# Patient Record
Sex: MALE | Race: OTHER | Employment: UNEMPLOYED | URBAN - METROPOLITAN AREA
[De-identification: names, ages, dates, MRNs, and addresses within clinical notes are randomized per-mention and may not be internally consistent; named-entity substitution may affect disease eponyms.]

---

## 2022-07-19 ENCOUNTER — APPOINTMENT (OUTPATIENT)
Dept: CT IMAGING | Age: 42
DRG: 440 | End: 2022-07-19

## 2022-07-19 ENCOUNTER — HOSPITAL ENCOUNTER (INPATIENT)
Age: 42
LOS: 2 days | Discharge: HOME | DRG: 440 | End: 2022-07-21
Attending: EMERGENCY MEDICINE | Admitting: INTERNAL MEDICINE

## 2022-07-19 ENCOUNTER — APPOINTMENT (OUTPATIENT)
Dept: ULTRASOUND IMAGING | Age: 42
DRG: 440 | End: 2022-07-19

## 2022-07-19 DIAGNOSIS — E11.9 DIABETES MELLITUS, NEW ONSET (HCC): ICD-10-CM

## 2022-07-19 DIAGNOSIS — K85.00 IDIOPATHIC ACUTE PANCREATITIS WITHOUT INFECTION OR NECROSIS: Primary | ICD-10-CM

## 2022-07-19 PROBLEM — K85.90 ACUTE PANCREATITIS WITHOUT INFECTION OR NECROSIS, UNSPECIFIED PANCREATITIS TYPE: Status: ACTIVE | Noted: 2022-07-19

## 2022-07-19 LAB
A/G RATIO: 1.5 (ref 1.1–2.2)
ALBUMIN SERPL-MCNC: 4 G/DL (ref 3.4–5)
ALBUMIN SERPL-MCNC: 4.2 G/DL (ref 3.4–5)
ALBUMIN SERPL-MCNC: 4.8 G/DL (ref 3.4–5)
ALP BLD-CCNC: 113 U/L (ref 40–129)
ALP BLD-CCNC: 128 U/L (ref 40–129)
ALP BLD-CCNC: 146 U/L (ref 40–129)
ALT SERPL-CCNC: 23 U/L (ref 10–40)
ALT SERPL-CCNC: 23 U/L (ref 10–40)
ALT SERPL-CCNC: 25 U/L (ref 10–40)
ANION GAP SERPL CALCULATED.3IONS-SCNC: 10 MMOL/L (ref 3–16)
ANION GAP SERPL CALCULATED.3IONS-SCNC: 12 MMOL/L (ref 3–16)
ANION GAP SERPL CALCULATED.3IONS-SCNC: 16 MMOL/L (ref 3–16)
AST SERPL-CCNC: 31 U/L (ref 15–37)
AST SERPL-CCNC: 35 U/L (ref 15–37)
AST SERPL-CCNC: 38 U/L (ref 15–37)
BASOPHILS ABSOLUTE: 0.1 K/UL (ref 0–0.2)
BASOPHILS RELATIVE PERCENT: 0.7 %
BASOPHILS RELATIVE PERCENT: 0.9 %
BASOPHILS RELATIVE PERCENT: 1.2 %
BILIRUB SERPL-MCNC: 0.7 MG/DL (ref 0–1)
BILIRUB SERPL-MCNC: 0.7 MG/DL (ref 0–1)
BILIRUB SERPL-MCNC: 0.8 MG/DL (ref 0–1)
BUN BLDV-MCNC: 16 MG/DL (ref 7–20)
BUN BLDV-MCNC: 17 MG/DL (ref 7–20)
BUN BLDV-MCNC: 18 MG/DL (ref 7–20)
C-REACTIVE PROTEIN: <3 MG/L (ref 0–5.1)
C-REACTIVE PROTEIN: <3 MG/L (ref 0–5.1)
CALCIUM SERPL-MCNC: 8.8 MG/DL (ref 8.3–10.6)
CALCIUM SERPL-MCNC: 9.1 MG/DL (ref 8.3–10.6)
CALCIUM SERPL-MCNC: 9.5 MG/DL (ref 8.3–10.6)
CHLORIDE BLD-SCNC: 89 MMOL/L (ref 99–110)
CHLORIDE BLD-SCNC: 94 MMOL/L (ref 99–110)
CHLORIDE BLD-SCNC: 98 MMOL/L (ref 99–110)
CO2: 27 MMOL/L (ref 21–32)
CO2: 30 MMOL/L (ref 21–32)
CO2: 30 MMOL/L (ref 21–32)
CREAT SERPL-MCNC: 0.7 MG/DL (ref 0.9–1.3)
CREAT SERPL-MCNC: 0.8 MG/DL (ref 0.9–1.3)
CREAT SERPL-MCNC: 0.8 MG/DL (ref 0.9–1.3)
EKG ATRIAL RATE: 94 BPM
EKG DIAGNOSIS: NORMAL
EKG P AXIS: 47 DEGREES
EKG P-R INTERVAL: 162 MS
EKG Q-T INTERVAL: 360 MS
EKG QRS DURATION: 104 MS
EKG QTC CALCULATION (BAZETT): 450 MS
EKG R AXIS: -30 DEGREES
EKG T AXIS: 34 DEGREES
EKG VENTRICULAR RATE: 94 BPM
EOSINOPHILS ABSOLUTE: 0 K/UL (ref 0–0.6)
EOSINOPHILS RELATIVE PERCENT: 0.4 %
EOSINOPHILS RELATIVE PERCENT: 0.5 %
EOSINOPHILS RELATIVE PERCENT: 0.5 %
GFR AFRICAN AMERICAN: >60
GFR NON-AFRICAN AMERICAN: >60
GLUCOSE BLD-MCNC: 112 MG/DL (ref 70–99)
GLUCOSE BLD-MCNC: 117 MG/DL (ref 70–99)
GLUCOSE BLD-MCNC: 155 MG/DL (ref 70–99)
GLUCOSE BLD-MCNC: 205 MG/DL (ref 70–99)
GLUCOSE BLD-MCNC: 206 MG/DL (ref 70–99)
GLUCOSE BLD-MCNC: 231 MG/DL (ref 70–99)
GLUCOSE BLD-MCNC: 298 MG/DL (ref 70–99)
GLUCOSE BLD-MCNC: 364 MG/DL (ref 70–99)
HCT VFR BLD CALC: 37.4 % (ref 40.5–52.5)
HCT VFR BLD CALC: 38.3 % (ref 40.5–52.5)
HCT VFR BLD CALC: 43 % (ref 40.5–52.5)
HEMOGLOBIN: 12.9 G/DL (ref 13.5–17.5)
HEMOGLOBIN: 13.3 G/DL (ref 13.5–17.5)
HEMOGLOBIN: 15.1 G/DL (ref 13.5–17.5)
LIPASE: 112 U/L (ref 13–60)
LYMPHOCYTES ABSOLUTE: 1.6 K/UL (ref 1–5.1)
LYMPHOCYTES ABSOLUTE: 1.8 K/UL (ref 1–5.1)
LYMPHOCYTES ABSOLUTE: 2 K/UL (ref 1–5.1)
LYMPHOCYTES RELATIVE PERCENT: 19.6 %
LYMPHOCYTES RELATIVE PERCENT: 27 %
LYMPHOCYTES RELATIVE PERCENT: 31.3 %
MCH RBC QN AUTO: 31.8 PG (ref 26–34)
MCH RBC QN AUTO: 32 PG (ref 26–34)
MCH RBC QN AUTO: 32.3 PG (ref 26–34)
MCHC RBC AUTO-ENTMCNC: 34.5 G/DL (ref 31–36)
MCHC RBC AUTO-ENTMCNC: 34.9 G/DL (ref 31–36)
MCHC RBC AUTO-ENTMCNC: 35.1 G/DL (ref 31–36)
MCV RBC AUTO: 91.8 FL (ref 80–100)
MCV RBC AUTO: 92 FL (ref 80–100)
MCV RBC AUTO: 92.1 FL (ref 80–100)
MONOCYTES ABSOLUTE: 0.4 K/UL (ref 0–1.3)
MONOCYTES ABSOLUTE: 0.5 K/UL (ref 0–1.3)
MONOCYTES ABSOLUTE: 0.9 K/UL (ref 0–1.3)
MONOCYTES RELATIVE PERCENT: 6.9 %
MONOCYTES RELATIVE PERCENT: 7.8 %
MONOCYTES RELATIVE PERCENT: 8.8 %
NEUTROPHILS ABSOLUTE: 3.1 K/UL (ref 1.7–7.7)
NEUTROPHILS ABSOLUTE: 4.4 K/UL (ref 1.7–7.7)
NEUTROPHILS ABSOLUTE: 7.3 K/UL (ref 1.7–7.7)
NEUTROPHILS RELATIVE PERCENT: 60.1 %
NEUTROPHILS RELATIVE PERCENT: 63.9 %
NEUTROPHILS RELATIVE PERCENT: 70.4 %
PDW BLD-RTO: 12.7 % (ref 12.4–15.4)
PDW BLD-RTO: 12.8 % (ref 12.4–15.4)
PDW BLD-RTO: 12.8 % (ref 12.4–15.4)
PERFORMED ON: ABNORMAL
PLATELET # BLD: 312 K/UL (ref 135–450)
PLATELET # BLD: 339 K/UL (ref 135–450)
PLATELET # BLD: 364 K/UL (ref 135–450)
PMV BLD AUTO: 7.7 FL (ref 5–10.5)
PMV BLD AUTO: 7.8 FL (ref 5–10.5)
PMV BLD AUTO: 7.8 FL (ref 5–10.5)
POTASSIUM REFLEX MAGNESIUM: 4.1 MMOL/L (ref 3.5–5.1)
POTASSIUM REFLEX MAGNESIUM: 4.2 MMOL/L (ref 3.5–5.1)
POTASSIUM REFLEX MAGNESIUM: 4.3 MMOL/L (ref 3.5–5.1)
RBC # BLD: 4.06 M/UL (ref 4.2–5.9)
RBC # BLD: 4.17 M/UL (ref 4.2–5.9)
RBC # BLD: 4.67 M/UL (ref 4.2–5.9)
SODIUM BLD-SCNC: 132 MMOL/L (ref 136–145)
SODIUM BLD-SCNC: 136 MMOL/L (ref 136–145)
SODIUM BLD-SCNC: 138 MMOL/L (ref 136–145)
TOTAL PROTEIN: 6.7 G/DL (ref 6.4–8.2)
TOTAL PROTEIN: 7 G/DL (ref 6.4–8.2)
TOTAL PROTEIN: 8 G/DL (ref 6.4–8.2)
TRIGL SERPL-MCNC: 1038 MG/DL (ref 0–150)
TRIGL SERPL-MCNC: 1060 MG/DL (ref 0–150)
TROPONIN: <0.01 NG/ML
WBC # BLD: 10.4 K/UL (ref 4–11)
WBC # BLD: 5.2 K/UL (ref 4–11)
WBC # BLD: 6.8 K/UL (ref 4–11)

## 2022-07-19 PROCEDURE — 2580000003 HC RX 258: Performed by: INTERNAL MEDICINE

## 2022-07-19 PROCEDURE — 6360000002 HC RX W HCPCS: Performed by: INTERNAL MEDICINE

## 2022-07-19 PROCEDURE — 84484 ASSAY OF TROPONIN QUANT: CPT

## 2022-07-19 PROCEDURE — 1200000000 HC SEMI PRIVATE

## 2022-07-19 PROCEDURE — 6370000000 HC RX 637 (ALT 250 FOR IP): Performed by: INTERNAL MEDICINE

## 2022-07-19 PROCEDURE — 86140 C-REACTIVE PROTEIN: CPT

## 2022-07-19 PROCEDURE — 6360000002 HC RX W HCPCS: Performed by: EMERGENCY MEDICINE

## 2022-07-19 PROCEDURE — 83690 ASSAY OF LIPASE: CPT

## 2022-07-19 PROCEDURE — 2580000003 HC RX 258: Performed by: PHYSICIAN ASSISTANT

## 2022-07-19 PROCEDURE — 2580000003 HC RX 258: Performed by: EMERGENCY MEDICINE

## 2022-07-19 PROCEDURE — C9113 INJ PANTOPRAZOLE SODIUM, VIA: HCPCS | Performed by: INTERNAL MEDICINE

## 2022-07-19 PROCEDURE — 76705 ECHO EXAM OF ABDOMEN: CPT

## 2022-07-19 PROCEDURE — 74177 CT ABD & PELVIS W/CONTRAST: CPT

## 2022-07-19 PROCEDURE — 96375 TX/PRO/DX INJ NEW DRUG ADDON: CPT

## 2022-07-19 PROCEDURE — 93010 ELECTROCARDIOGRAM REPORT: CPT | Performed by: INTERNAL MEDICINE

## 2022-07-19 PROCEDURE — 99285 EMERGENCY DEPT VISIT HI MDM: CPT

## 2022-07-19 PROCEDURE — 36415 COLL VENOUS BLD VENIPUNCTURE: CPT

## 2022-07-19 PROCEDURE — 80053 COMPREHEN METABOLIC PANEL: CPT

## 2022-07-19 PROCEDURE — 6360000004 HC RX CONTRAST MEDICATION: Performed by: EMERGENCY MEDICINE

## 2022-07-19 PROCEDURE — 84478 ASSAY OF TRIGLYCERIDES: CPT

## 2022-07-19 PROCEDURE — 93005 ELECTROCARDIOGRAM TRACING: CPT | Performed by: EMERGENCY MEDICINE

## 2022-07-19 PROCEDURE — 87449 NOS EACH ORGANISM AG IA: CPT

## 2022-07-19 PROCEDURE — 6360000002 HC RX W HCPCS

## 2022-07-19 PROCEDURE — 85025 COMPLETE CBC W/AUTO DIFF WBC: CPT

## 2022-07-19 PROCEDURE — 83036 HEMOGLOBIN GLYCOSYLATED A1C: CPT

## 2022-07-19 PROCEDURE — 96374 THER/PROPH/DIAG INJ IV PUSH: CPT

## 2022-07-19 RX ORDER — KETOROLAC TROMETHAMINE 30 MG/ML
30 INJECTION, SOLUTION INTRAMUSCULAR; INTRAVENOUS EVERY 6 HOURS PRN
Status: DISPENSED | OUTPATIENT
Start: 2022-07-19 | End: 2022-07-21

## 2022-07-19 RX ORDER — DIAZEPAM 5 MG/1
15 TABLET ORAL
Status: DISCONTINUED | OUTPATIENT
Start: 2022-07-19 | End: 2022-07-21 | Stop reason: HOSPADM

## 2022-07-19 RX ORDER — SODIUM CHLORIDE 0.9 % (FLUSH) 0.9 %
5-40 SYRINGE (ML) INJECTION EVERY 12 HOURS SCHEDULED
Status: DISCONTINUED | OUTPATIENT
Start: 2022-07-19 | End: 2022-07-21 | Stop reason: HOSPADM

## 2022-07-19 RX ORDER — LORAZEPAM 1 MG/1
3 TABLET ORAL
Status: DISCONTINUED | OUTPATIENT
Start: 2022-07-19 | End: 2022-07-19

## 2022-07-19 RX ORDER — ONDANSETRON 4 MG/1
4 TABLET, ORALLY DISINTEGRATING ORAL EVERY 8 HOURS PRN
Status: DISCONTINUED | OUTPATIENT
Start: 2022-07-19 | End: 2022-07-21 | Stop reason: HOSPADM

## 2022-07-19 RX ORDER — SODIUM CHLORIDE, SODIUM LACTATE, POTASSIUM CHLORIDE, CALCIUM CHLORIDE 600; 310; 30; 20 MG/100ML; MG/100ML; MG/100ML; MG/100ML
INJECTION, SOLUTION INTRAVENOUS CONTINUOUS
Status: DISCONTINUED | OUTPATIENT
Start: 2022-07-19 | End: 2022-07-19

## 2022-07-19 RX ORDER — SODIUM CHLORIDE 0.9 % (FLUSH) 0.9 %
5-40 SYRINGE (ML) INJECTION PRN
Status: DISCONTINUED | OUTPATIENT
Start: 2022-07-19 | End: 2022-07-21 | Stop reason: HOSPADM

## 2022-07-19 RX ORDER — MORPHINE SULFATE 2 MG/ML
2 INJECTION, SOLUTION INTRAMUSCULAR; INTRAVENOUS EVERY 4 HOURS PRN
Status: DISCONTINUED | OUTPATIENT
Start: 2022-07-19 | End: 2022-07-21 | Stop reason: HOSPADM

## 2022-07-19 RX ORDER — INSULIN LISPRO 100 [IU]/ML
0-6 INJECTION, SOLUTION INTRAVENOUS; SUBCUTANEOUS EVERY 4 HOURS
Status: DISCONTINUED | OUTPATIENT
Start: 2022-07-19 | End: 2022-07-20

## 2022-07-19 RX ORDER — DIAZEPAM 5 MG/ML
5 INJECTION, SOLUTION INTRAMUSCULAR; INTRAVENOUS
Status: DISCONTINUED | OUTPATIENT
Start: 2022-07-19 | End: 2022-07-21 | Stop reason: HOSPADM

## 2022-07-19 RX ORDER — LORAZEPAM 2 MG/ML
1 INJECTION INTRAMUSCULAR
Status: DISCONTINUED | OUTPATIENT
Start: 2022-07-19 | End: 2022-07-19

## 2022-07-19 RX ORDER — ONDANSETRON 2 MG/ML
INJECTION INTRAMUSCULAR; INTRAVENOUS
Status: COMPLETED
Start: 2022-07-19 | End: 2022-07-19

## 2022-07-19 RX ORDER — SODIUM CHLORIDE, SODIUM LACTATE, POTASSIUM CHLORIDE, CALCIUM CHLORIDE 600; 310; 30; 20 MG/100ML; MG/100ML; MG/100ML; MG/100ML
1000 INJECTION, SOLUTION INTRAVENOUS ONCE
Status: COMPLETED | OUTPATIENT
Start: 2022-07-19 | End: 2022-07-19

## 2022-07-19 RX ORDER — LORAZEPAM 1 MG/1
2 TABLET ORAL
Status: DISCONTINUED | OUTPATIENT
Start: 2022-07-19 | End: 2022-07-19

## 2022-07-19 RX ORDER — DIAZEPAM 5 MG/1
10 TABLET ORAL
Status: DISCONTINUED | OUTPATIENT
Start: 2022-07-19 | End: 2022-07-21 | Stop reason: HOSPADM

## 2022-07-19 RX ORDER — DIAZEPAM 5 MG/ML
10 INJECTION, SOLUTION INTRAMUSCULAR; INTRAVENOUS
Status: DISCONTINUED | OUTPATIENT
Start: 2022-07-19 | End: 2022-07-21 | Stop reason: HOSPADM

## 2022-07-19 RX ORDER — ONDANSETRON 2 MG/ML
4 INJECTION INTRAMUSCULAR; INTRAVENOUS EVERY 6 HOURS PRN
Status: DISCONTINUED | OUTPATIENT
Start: 2022-07-19 | End: 2022-07-19 | Stop reason: SDUPTHER

## 2022-07-19 RX ORDER — SODIUM CHLORIDE 9 MG/ML
INJECTION, SOLUTION INTRAVENOUS PRN
Status: DISCONTINUED | OUTPATIENT
Start: 2022-07-19 | End: 2022-07-21 | Stop reason: HOSPADM

## 2022-07-19 RX ORDER — GAUZE BANDAGE 2" X 2"
100 BANDAGE TOPICAL DAILY
Status: DISCONTINUED | OUTPATIENT
Start: 2022-07-19 | End: 2022-07-21 | Stop reason: HOSPADM

## 2022-07-19 RX ORDER — PANTOPRAZOLE SODIUM 40 MG/10ML
40 INJECTION, POWDER, LYOPHILIZED, FOR SOLUTION INTRAVENOUS DAILY
Status: DISCONTINUED | OUTPATIENT
Start: 2022-07-19 | End: 2022-07-21 | Stop reason: HOSPADM

## 2022-07-19 RX ORDER — DEXTROSE MONOHYDRATE 50 MG/ML
100 INJECTION, SOLUTION INTRAVENOUS PRN
Status: DISCONTINUED | OUTPATIENT
Start: 2022-07-19 | End: 2022-07-21 | Stop reason: HOSPADM

## 2022-07-19 RX ORDER — DIAZEPAM 5 MG/1
20 TABLET ORAL
Status: DISCONTINUED | OUTPATIENT
Start: 2022-07-19 | End: 2022-07-21 | Stop reason: HOSPADM

## 2022-07-19 RX ORDER — ONDANSETRON 2 MG/ML
4 INJECTION INTRAMUSCULAR; INTRAVENOUS EVERY 6 HOURS PRN
Status: DISCONTINUED | OUTPATIENT
Start: 2022-07-19 | End: 2022-07-21 | Stop reason: HOSPADM

## 2022-07-19 RX ORDER — LORAZEPAM 2 MG/ML
4 INJECTION INTRAMUSCULAR
Status: DISCONTINUED | OUTPATIENT
Start: 2022-07-19 | End: 2022-07-19

## 2022-07-19 RX ORDER — SODIUM CHLORIDE, SODIUM LACTATE, POTASSIUM CHLORIDE, CALCIUM CHLORIDE 600; 310; 30; 20 MG/100ML; MG/100ML; MG/100ML; MG/100ML
INJECTION, SOLUTION INTRAVENOUS CONTINUOUS
Status: DISCONTINUED | OUTPATIENT
Start: 2022-07-19 | End: 2022-07-20 | Stop reason: ALTCHOICE

## 2022-07-19 RX ORDER — ENOXAPARIN SODIUM 100 MG/ML
40 INJECTION SUBCUTANEOUS DAILY
Status: DISCONTINUED | OUTPATIENT
Start: 2022-07-19 | End: 2022-07-21 | Stop reason: HOSPADM

## 2022-07-19 RX ORDER — DIAZEPAM 5 MG/ML
15 INJECTION, SOLUTION INTRAMUSCULAR; INTRAVENOUS
Status: DISCONTINUED | OUTPATIENT
Start: 2022-07-19 | End: 2022-07-21 | Stop reason: HOSPADM

## 2022-07-19 RX ORDER — DIAZEPAM 5 MG/ML
20 INJECTION, SOLUTION INTRAMUSCULAR; INTRAVENOUS
Status: DISCONTINUED | OUTPATIENT
Start: 2022-07-19 | End: 2022-07-21 | Stop reason: HOSPADM

## 2022-07-19 RX ORDER — LORAZEPAM 2 MG/ML
2 INJECTION INTRAMUSCULAR
Status: DISCONTINUED | OUTPATIENT
Start: 2022-07-19 | End: 2022-07-19

## 2022-07-19 RX ORDER — MORPHINE SULFATE 4 MG/ML
4 INJECTION, SOLUTION INTRAMUSCULAR; INTRAVENOUS EVERY 4 HOURS PRN
Status: DISCONTINUED | OUTPATIENT
Start: 2022-07-19 | End: 2022-07-21 | Stop reason: HOSPADM

## 2022-07-19 RX ORDER — LORAZEPAM 1 MG/1
1 TABLET ORAL
Status: DISCONTINUED | OUTPATIENT
Start: 2022-07-19 | End: 2022-07-19

## 2022-07-19 RX ORDER — DIAZEPAM 5 MG/1
5 TABLET ORAL
Status: DISCONTINUED | OUTPATIENT
Start: 2022-07-19 | End: 2022-07-21 | Stop reason: HOSPADM

## 2022-07-19 RX ORDER — LORAZEPAM 1 MG/1
4 TABLET ORAL
Status: DISCONTINUED | OUTPATIENT
Start: 2022-07-19 | End: 2022-07-19

## 2022-07-19 RX ORDER — PANTOPRAZOLE SODIUM 40 MG/1
40 TABLET, DELAYED RELEASE ORAL
Status: DISCONTINUED | OUTPATIENT
Start: 2022-07-20 | End: 2022-07-19

## 2022-07-19 RX ORDER — FOLIC ACID 1 MG/1
1 TABLET ORAL DAILY
Status: DISCONTINUED | OUTPATIENT
Start: 2022-07-19 | End: 2022-07-21 | Stop reason: HOSPADM

## 2022-07-19 RX ORDER — KETOROLAC TROMETHAMINE 30 MG/ML
15 INJECTION, SOLUTION INTRAMUSCULAR; INTRAVENOUS ONCE
Status: COMPLETED | OUTPATIENT
Start: 2022-07-19 | End: 2022-07-19

## 2022-07-19 RX ORDER — LORAZEPAM 2 MG/ML
3 INJECTION INTRAMUSCULAR
Status: DISCONTINUED | OUTPATIENT
Start: 2022-07-19 | End: 2022-07-19

## 2022-07-19 RX ADMIN — SODIUM CHLORIDE, POTASSIUM CHLORIDE, SODIUM LACTATE AND CALCIUM CHLORIDE: 600; 310; 30; 20 INJECTION, SOLUTION INTRAVENOUS at 22:26

## 2022-07-19 RX ADMIN — ENOXAPARIN SODIUM 40 MG: 100 INJECTION SUBCUTANEOUS at 17:24

## 2022-07-19 RX ADMIN — SODIUM CHLORIDE, POTASSIUM CHLORIDE, SODIUM LACTATE AND CALCIUM CHLORIDE: 600; 310; 30; 20 INJECTION, SOLUTION INTRAVENOUS at 17:25

## 2022-07-19 RX ADMIN — INSULIN LISPRO 2 UNITS: 100 INJECTION, SOLUTION INTRAVENOUS; SUBCUTANEOUS at 13:34

## 2022-07-19 RX ADMIN — KETOROLAC TROMETHAMINE 30 MG: 30 INJECTION, SOLUTION INTRAMUSCULAR at 20:09

## 2022-07-19 RX ADMIN — IOPAMIDOL 75 ML: 755 INJECTION, SOLUTION INTRAVENOUS at 06:00

## 2022-07-19 RX ADMIN — MORPHINE SULFATE 4 MG: 4 INJECTION, SOLUTION INTRAMUSCULAR; INTRAVENOUS at 17:24

## 2022-07-19 RX ADMIN — FOLIC ACID 1 MG: 1 TABLET ORAL at 13:34

## 2022-07-19 RX ADMIN — ONDANSETRON 4 MG: 2 INJECTION INTRAMUSCULAR; INTRAVENOUS at 05:26

## 2022-07-19 RX ADMIN — MORPHINE SULFATE 4 MG: 4 INJECTION, SOLUTION INTRAMUSCULAR; INTRAVENOUS at 11:22

## 2022-07-19 RX ADMIN — SODIUM CHLORIDE, POTASSIUM CHLORIDE, SODIUM LACTATE AND CALCIUM CHLORIDE 1000 ML: 600; 310; 30; 20 INJECTION, SOLUTION INTRAVENOUS at 05:34

## 2022-07-19 RX ADMIN — KETOROLAC TROMETHAMINE 15 MG: 30 INJECTION, SOLUTION INTRAMUSCULAR at 05:27

## 2022-07-19 RX ADMIN — SODIUM CHLORIDE, POTASSIUM CHLORIDE, SODIUM LACTATE AND CALCIUM CHLORIDE: 600; 310; 30; 20 INJECTION, SOLUTION INTRAVENOUS at 07:33

## 2022-07-19 RX ADMIN — THIAMINE HCL TAB 100 MG 100 MG: 100 TAB at 13:34

## 2022-07-19 RX ADMIN — INSULIN LISPRO 3 UNITS: 100 INJECTION, SOLUTION INTRAVENOUS; SUBCUTANEOUS at 07:33

## 2022-07-19 RX ADMIN — PANTOPRAZOLE SODIUM 40 MG: 40 INJECTION, POWDER, FOR SOLUTION INTRAVENOUS at 13:33

## 2022-07-19 RX ADMIN — SODIUM CHLORIDE, PRESERVATIVE FREE 10 ML: 5 INJECTION INTRAVENOUS at 20:14

## 2022-07-19 RX ADMIN — KETOROLAC TROMETHAMINE 30 MG: 30 INJECTION, SOLUTION INTRAMUSCULAR at 13:31

## 2022-07-19 ASSESSMENT — PAIN DESCRIPTION - DESCRIPTORS: DESCRIPTORS: ACHING

## 2022-07-19 ASSESSMENT — PAIN SCALES - GENERAL
PAINLEVEL_OUTOF10: 10
PAINLEVEL_OUTOF10: 10
PAINLEVEL_OUTOF10: 8
PAINLEVEL_OUTOF10: 9
PAINLEVEL_OUTOF10: 7
PAINLEVEL_OUTOF10: 10
PAINLEVEL_OUTOF10: 7
PAINLEVEL_OUTOF10: 10

## 2022-07-19 ASSESSMENT — PAIN DESCRIPTION - ORIENTATION: ORIENTATION: RIGHT

## 2022-07-19 ASSESSMENT — PAIN DESCRIPTION - LOCATION
LOCATION: ABDOMEN
LOCATION: ABDOMEN

## 2022-07-19 ASSESSMENT — PAIN - FUNCTIONAL ASSESSMENT: PAIN_FUNCTIONAL_ASSESSMENT: 0-10

## 2022-07-19 NOTE — CONSULTS
Gastroenterology Consult Note        Patient: Katherin Anne  : 1980  Acct#:      Date:  2022    Subjective:       History of Present Illness  Patient is a 37 y.o.  male admitted with Diabetes mellitus, new onset ([facility] [E11.9]  Idiopathic acute pancreatitis without infection or necrosis [K85.00]  Acute pancreatitis without infection or necrosis, unspecified pancreatitis type [K85.90] who is seen in consult for acute pancreatitis. History of diabetes. Patient reports being diagnosed with acute pancreatitis 3 years ago in July. States it was from alcohol abuse. At that time he was drinking alcohol daily. Now drinks a sixpack of beer over the weekend. Last night he had sudden onset of sharp, severe epigastric pain which radiated around into the back. Had associated nausea but no vomiting. Pain persisted so he came to the ER. Lipase was 112. CT consistent with acute pancreatitis. There was also fatty liver and a hypodense nodule in the right hepatic lobe for which MRI was recommended. Still with abdominal pain today but controlled with pain meds. Does not take any meds at home. Past Medical History:   Diagnosis Date    Acute pancreatitis     Diabetes mellitus ([facility]       Past Surgical History:   Procedure Laterality Date    APPENDECTOMY        Past Endoscopic History    Admission Meds  No current facility-administered medications on file prior to encounter. Current Outpatient Medications on File Prior to Encounter   Medication Sig Dispense Refill    ibuprofen (IBU) 800 MG tablet Take 1 tablet by mouth every 6 hours as needed for Pain.  120 tablet 0            Allergies  No Known Allergies   Social   Social History     Tobacco Use    Smoking status: Never    Smokeless tobacco: Never   Substance Use Topics    Alcohol use: Not on file     Comment: weekend        Family History   Problem Relation Age of Onset    No Known Problems Mother     No Known Problems Father Review of Systems  Constitutional: negative for fevers, chills, sweats    Ears, nose, mouth, throat, and face: negative for nasal congestion and sore throat   Respiratory: negative for cough and shortness of breath   Cardiovascular: negative for chest pain and dyspnea   Gastrointestinal: see hpi   Genitourinary:negative for dysuria and frequency   Integument/breast: negative for pruritus and rash   Hematologic/lymphatic: negative for bleeding and easy bruising   Musculoskeletal:negative for arthralgias and myalgias   Neurological: negative for dizziness and weakness       Physical Exam  Blood pressure 136/80, pulse 78, temperature 98.3 °F (36.8 °C), temperature source Oral, resp. rate 14, height 5' 0.63\" (1.54 m), weight 138 lb 1.6 oz (62.6 kg), SpO2 98 %. General appearance: alert, cooperative, no distress, appears stated age  Eyes: Anicteric  Head: Normocephalic, without obvious abnormality  Lungs: clear to auscultation bilaterally, Normal Effort  Heart: regular rate and rhythm, normal S1 and S2, no murmurs or rubs  Abdomen: soft, non-tender. Bowel sounds normal. No masses,  no organomegaly. Extremities: atraumatic, no cyanosis or edema  Skin: warm and dry, no jaundice  Neuro: Grossly intact, A&OX3  Musculoskeletal: 5/5  strength BUE      Data Review:    Recent Labs     07/19/22  0506 07/19/22  1145   WBC 5.2 6.8   HGB 15.1 13.3*   HCT 43.0 38.3*   MCV 92.0 91.8    339     Recent Labs     07/19/22  0506 07/19/22  1145   * 136   K 4.1 4.2   CL 89* 94*   CO2 27 30   BUN 17 18   CREATININE 0.8* 0.8*     Recent Labs     07/19/22  0506 07/19/22  1145   AST 38* 31   ALT 25 23   BILITOT 0.8 0.7   ALKPHOS 146* 128     Recent Labs     07/19/22  0506   LIPASE 112.0*     No results for input(s): PROTIME, INR in the last 72 hours. No results for input(s): PTT in the last 72 hours. No results for input(s): OCCULTBLD in the last 72 hours.     Imaging Studies: personally performed a face to face diagnostic evaluation on this patient. I have interviewed and examined the patient and I agree with the findings and recommended plan of care. In summary, my findings and plan are the followin-year-old Kyrgyz-speaking male presenting with acute onset epigastric pain with radiation to the back. History of diabetes and had alcoholic induced pancreatitis about 3 years ago. Currently, patient only drinks 6 beers over the weekend. Work-up here confirmed acute pancreatitis with incidental findings of hyperdense nodule in the right hepatic lobe, calcifications in the pancreas   Vital signs are stable. Abdomen soft, tender in the epigastrium. No rebound or guarding  Impressions  Acute pancreatitis. Patient denies heavy alcohol use currently.   This could be idiopathic or from hypertriglyceridemia since patient is diabetic  Plans: IVF  ml/hour, NPO, pain control per primary service, check lipid panel in AM labs, OP EUS in 6-8 weeks     Shell English MD, MSc  [facility]  22

## 2022-07-19 NOTE — H&P
medical history of Diabetes mellitus ([facility] Past Surgical History:   has a past surgical history that includes Appendectomy. Medications:  No current facility-administered medications on file prior to encounter. Current Outpatient Medications on File Prior to Encounter   Medication Sig Dispense Refill    ibuprofen (IBU) 800 MG tablet Take 1 tablet by mouth every 6 hours as needed for Pain. 120 tablet 0       Allergies:  No Known Allergies     Social History:  Patient Lives with family. reports that he has never smoked. He has never used smokeless tobacco. He reports that he does not use drugs. Family History:  family history includes No Known Problems in his father and mother. Physical Exam:  BP (!) 143/91   Pulse 100   Temp 97.9 °F (36.6 °C) (Oral)   Resp 20   SpO2 98%     General appearance: Appears comfortable. Well nourished  Eyes: Sclera clear, pupils equal  ENT: Moist mucus membranes, no thrush. Trachea midline. Cardiovascular: Regular rhythm, normal S1, S2. No murmur, gallop, rub. No edema in lower extremities  Respiratory: Clear to auscultation bilaterally, no wheeze, good inspiratory effort  Gastrointestinal: Abdomen soft, epigastric tenderness, not distended, normal bowel sounds  Musculoskeletal: No cyanosis in digits, neck supple  Neurology: Cranial nerves grossly intact. Alert and oriented in time, place and person. No speech or motor deficits  Psychiatry: Appropriate affect. Not agitated  Skin: Warm, dry, normal turgor, no rash  Brisk capillary refill, peripheral pulses palpable.     Labs:  CBC:   Lab Results   Component Value Date/Time    WBC 5.2 07/19/2022 05:06 AM    RBC 4.67 07/19/2022 05:06 AM    HGB 15.1 07/19/2022 05:06 AM    HCT 43.0 07/19/2022 05:06 AM    MCV 92.0 07/19/2022 05:06 AM    MCH 32.3 07/19/2022 05:06 AM    MCHC 35.1 07/19/2022 05:06 AM    RDW 12.8 07/19/2022 05:06 AM     07/19/2022 05:06 AM    MPV 7.8 07/19/2022 05:06 AM     BMP:    Lab Results Component Value Date/Time     07/19/2022 05:06 AM    K 4.1 07/19/2022 05:06 AM    CL 89 07/19/2022 05:06 AM    CO2 27 07/19/2022 05:06 AM    BUN 17 07/19/2022 05:06 AM    CREATININE 0.8 07/19/2022 05:06 AM    CALCIUM 9.5 07/19/2022 05:06 AM    GFRAA >60 07/19/2022 05:06 AM    LABGLOM >60 07/19/2022 05:06 AM    GLUCOSE 364 07/19/2022 05:06 AM     CT ABDOMEN PELVIS W IV CONTRAST Additional Contrast? None   Final Result   Ill-defined peripancreatic fluid and edema, compatible with acute   pancreatitis. No drainable pseudocyst noted      Fatty liver. Hyperdense nodule is seen peripherally in the right hepatic   lobe. Most commonly this represents focal fatty sparing or hemangioma in a   patient of this age. Consider follow-up non urgent abdominal MRI, with and   without IV contrast, liver protocol for further characterization      No obvious gallstones noted on CT      Punctate pulmonary nodule left lower lobe. Please see follow-up   recommendations below      Guidelines for follow-up and management of pulmonary nodules found on abdomen   CT:      <6 mm - No follow up recommend on the basis of the estimated low risk of   malignancy. Radiology 2017 http://pubs. rsna.org/doi/full/10.1148/radiol. 2823339831         [address] organ? LIVER, GALLBLADDER    (Results Pending)     Chest Xray: Chest imaging as above  EKG:  NSR, LAD  I visualized CXR images and EKG strip. Problem List  Principal Problem:    Acute pancreatitis without infection or necrosis, unspecified pancreatitis type  Resolved Problems:    * No resolved hospital problems. *        Assessment/Plan:     Acute alcoholic pancreatitis:  CT abdomen and pelvis: Ill-defined peripancreatic fluid and edema compatible with acute pancreatitis. Continue aggressive IV fluid resuscitation. Monitor BUN/hematocrit. NPO. Pain control. Follow-up triglycerides and RUQ US. Alcohol abuse:  Cessation counseling provided.   Social work consult to provide resources. Folate, thiamine and multivitamin. Monitor on CIWA protocol. T2 DM not on long-term insulin with hyperglycemia:  Follow-up A1c. Monitor blood glucose on CDI. Hepatic Nodule: Incidental finding on CT. MRI liver protocol recommended. Thank you      Pulmonary Nodule:  <6 mm in the left lower lobe. No follow-up recommended. DVT prophylaxis: Lovenox  Code status: Full code  Diet: NPO  IV access: peripheral  [place]ved[place] Cathete: None    Admit as inpatient. I anticipate hospitalization spanning more than two midnights for investigation and treatment of the above medically necessary diagnoses. Please note that some part of this chart was generated using Dragon dictation software. Although every effort was made to ensure the accuracy of this automated transcription, some errors in transcription may have occurred inadvertently. If you may need any clarification, please do not hesitate to contact me through [facility].        Gabriel Velásquez MD    7/19/2022 10:17 AM

## 2022-07-19 NOTE — ED PROVIDER NOTES
[facility] [address]  eMERGENCY dEPARTMENT eNCOUnter        Pt Name: Vandana Allen  MRN: [id number]  [place] 1980  Date of evaluation: 7/19/2022  Provider: Ramandeep López MD  PCP: No primary care provider on file. CHIEF COMPLAINT       Chief Complaint   Patient presents with    Abdominal Pain     Patient triaged using  #[id number], states pain in the pit of his stomach that goes to his back, states started yesterday around 9 pm. States [place] and had pancreatitis. HISTORY OFPRESENT ILLNESS   (Location/Symptom, Timing/Onset, Context/Setting, Quality, Duration, Modifying Factors,Severity)  Note limiting factors. Vandana Allen is a 37 y.o. male complains of epigastric pain that radiates into the back since this evening with nausea and vomiting patient had 8 beers 4 days ago has had pancreatitis in the past and his [place] country states the pain is fairly constant    Nursing Notes were all reviewed and agreed with or any disagreements were addressed  in the HPI. REVIEW OF SYSTEMS    (2-9 systems for level 4, 10 or more for level 5)       REVIEW OF SYSTEMS    Constitutional:  Denies fever, chills, or weakness   Eyes:  Denies vision changes  HENT:  Denies sore throat or neck pain   Respiratory:  Denies cough or shortness of breath   Cardiovascular:  Denies chest pain  GI:  Denies abdominal pain, nausea, vomiting, or diarrhea   Musculoskeletal:  Denies back pain   Skin: no rash or vesicles   Neurologic:  no headache weakness focal    Lymphatic:  no swollen  nodes   Psychiatric: no si or hs thoughts     All systems negative except as marked. Positives and Pertinent negatives as per HPI. Except as noted above in the ROS, all other systems were reviewed andnegative.        PASTMEDICAL HISTORY     Past Medical History:   Diagnosis Date    Acute pancreatitis     Diabetes mellitus ([facility]          SURGICAL HISTORY       Past Surgical History:   Procedure Laterality Date    APPENDECTOMY           CURRENT MEDICATIONS       Current Discharge Medication List        CONTINUE these medications which have NOT CHANGED    Details   ibuprofen (IBU) 800 MG tablet Take 1 tablet by mouth every 6 hours as needed for Pain. Qty: 120 tablet, Refills: 0             ALLERGIES     Patient has no known allergies. FAMILY HISTORY       Family History   Problem Relation Age of Onset    No Known Problems Mother     No Known Problems Father           SOCIAL HISTORY       Social History     Socioeconomic History    Marital status: Single     Spouse name: None    Number of children: None    Years of education: None    Highest education level: None   Tobacco Use    Smoking status: Never    Smokeless tobacco: Never   Substance and Sexual Activity    Drug use: No       SCREENINGS    [place] Coma Scale  Eye Opening: Spontaneous  Best Verbal Response: Oriented  Best Motor Response: Obeys commands  [place] Coma Scale Score: 15        PHYSICAL EXAM    (up to 7 for level 4, 8 or more for level 5)     ED Triage Vitals [07/19/22 0422]   BP Temp Temp Source Heart Rate Resp SpO2 Height Weight   125/82 97.9 °F (36.6 °C) Oral 100 20 100 % -- --           General Appearance:  Alert, cooperative, no distress, appears stated age. Head:  Normocephalic, without obvious abnormality, atraumatic. Eyes:  conjunctiva/corneas clear, EOM's intact. Sclera anicteric. ENT: Mucous membranes moist.   Neck: Supple, symmetrical, trachea midline, no adenopathy. No jugular venous distention. Lungs:   No Respiratory Distress. no rales  rhonchi rub   Chest Wall:  Nontender  no deformity   Heart:  Rsr no murmer gallop    Abdomen:   Soft epigastric tenderness no organomegally    Extremities:  Full range of motion. no deformity   Pulses: Equal  upper and lower    Skin:  No rashes or lesions to exposed skin. Neurologic: Alert and oriented X 3. Motor grossly normal.  Speech clear.  Cr n 2-12 intact       DIAGNOSTIC RESULTS   LABS:    Labs Reviewed   COMPREHENSIVE METABOLIC PANEL W/ REFLEX TO MG FOR LOW K - Abnormal; Notable for the following components:       Result Value    Sodium 132 (*)     Chloride 89 (*)     Glucose 364 (*)     CREATININE 0.8 (*)     Alkaline Phosphatase 146 (*)     AST 38 (*)     All other components within normal limits   LIPASE - Abnormal; Notable for the following components:    Lipase 112.0 (*)     All other components within normal limits   CBC WITH AUTO DIFFERENTIAL - Abnormal; Notable for the following components:    RBC 4.17 (*)     Hemoglobin 13.3 (*)     Hematocrit 38.3 (*)     All other components within normal limits   COMPREHENSIVE METABOLIC PANEL W/ REFLEX TO MG FOR LOW K - Abnormal; Notable for the following components:    Chloride 94 (*)     Glucose 206 (*)     CREATININE 0.8 (*)     All other components within normal limits   TRIGLYCERIDES - Abnormal; Notable for the following components:    Triglycerides 1,038 (*)     All other components within normal limits   CBC WITH AUTO DIFFERENTIAL - Abnormal; Notable for the following components:    RBC 4.06 (*)     Hemoglobin 12.9 (*)     Hematocrit 37.4 (*)     All other components within normal limits   COMPREHENSIVE METABOLIC PANEL W/ REFLEX TO MG FOR LOW K - Abnormal; Notable for the following components:    Chloride 98 (*)     Glucose 155 (*)     CREATININE 0.7 (*)     All other components within normal limits   TRIGLYCERIDES - Abnormal; Notable for the following components:    Triglycerides 1,060 (*)     All other components within normal limits   POCT GLUCOSE - Abnormal; Notable for the following components:    POC Glucose 298 (*)     All other components within normal limits   POCT GLUCOSE - Abnormal; Notable for the following components:    POC Glucose 231 (*)     All other components within normal limits   POCT GLUCOSE - Abnormal; Notable for the following components:    POC Glucose 205 (*)     All other components within normal limits POCT GLUCOSE - Abnormal; Notable for the following components:    POC Glucose 112 (*)     All other components within normal limits   POCT GLUCOSE - Abnormal; Notable for the following components:    POC Glucose 117 (*)     All other components within normal limits   POCT GLUCOSE - Abnormal; Notable for the following components:    POC Glucose 132 (*)     All other components within normal limits   LEGIONELLA ANTIGEN, URINE   CBC WITH AUTO DIFFERENTIAL   TROPONIN   TROPONIN   C-REACTIVE PROTEIN   C-REACTIVE PROTEIN   TROPONIN   HEMOGLOBIN A1C   HEMOGLOBIN A1C   CBC WITH AUTO DIFFERENTIAL   COMPREHENSIVE METABOLIC PANEL   MAGNESIUM   PHOSPHORUS   LIPID PANEL   POCT GLUCOSE   POCT GLUCOSE   POCT GLUCOSE   POCT GLUCOSE   POCT GLUCOSE   POCT GLUCOSE   POCT GLUCOSE   POCT GLUCOSE   POCT GLUCOSE       All other labs were within normal range or not returned as of thisdictation. EKG: All EKG's are interpreted by the Emergency Department Physician who either signs or Co-signs this chart in the absence of a cardiologist.  EKG Interpretation    Interpreted by emergency department physician    Rhythm: normal sinus   Rate: normal  Axis: normal  Ectopy: none  Conduction: normal  ST Segments: no acute change  T Waves: no acute change  Q Waves: none    Clinical Impression: Normal sinus rhythm    Norman Mccauley MD        RADIOLOGY:   Non-plain film images such as CT, Ultrasound and MRI are read by the radiologist. Plainradiographic images are visualized and preliminarily interpreted by the  ED Provider with the belowfindings:        Interpretation per the Radiologist below, if available at the time of this note:    [address] [place] organ? LIVER, GALLBLADDER   Final Result   No cholelithiasis or cholecystitis      Fatty liver.       Nodular density seen in the liver on recent CT scan is not clearly seen by   ultrasound      RECOMMENDATIONS:   Unavailable         CT ABDOMEN PELVIS W IV CONTRAST Additional Contrast? None   Final Result   Ill-defined peripancreatic fluid and edema, compatible with acute   pancreatitis. No drainable pseudocyst noted      Fatty liver. Hyperdense nodule is seen peripherally in the right hepatic   lobe. Most commonly this represents focal fatty sparing or hemangioma in a   patient of this age. Consider follow-up non urgent abdominal MRI, with and   without IV contrast, liver protocol for further characterization      No obvious gallstones noted on CT      Punctate pulmonary nodule left lower lobe. Please see follow-up   recommendations below      Guidelines for follow-up and management of pulmonary nodules found on abdomen   CT:      <6 mm - No follow up recommend on the basis of the estimated low risk of   malignancy. Radiology 2017 http://pubs. rsna.org/doi/full/10.1148/radiol. 8316724213               PROCEDURES   Unless otherwise noted below, none     Procedures    CRITICAL CARE TIME   N/A      CONSULTS:  IP CONSULT TO GI  IP CONSULT TO SOCIAL WORK    EMERGENCY DEPARTMENT COURSE and DIFFERENTIAL DIAGNOSIS/MDM:   Vitals:    Vitals:    07/19/22 1207 07/19/22 1830 07/19/22 1945 07/20/22 0138   BP:  (!) 154/84 (!) 152/78 (!) 146/88   Pulse:  83 80 62   Resp:  14 16 18   Temp:  98.1 °F (36.7 °C) 98 °F (36.7 °C) 97.9 °F (36.6 °C)   TempSrc:  Oral Oral Oral   SpO2:  98% 98% 99%   Weight:       Height: 5' 0.63\" (1.54 m)          Patient was given the following medications:  Medications   dextrose bolus 10% 125 mL (has no administration in time range)     Or   dextrose bolus 10% 250 mL (has no administration in time range)   glucagon (rDNA) injection 1 mg (has no administration in time range)   dextrose 5 % solution (has no administration in time range)   insulin lispro (HUMALOG) injection vial 0-6 Units (0 Units SubCUTAneous Not Given 7/20/22 0056)   sodium chloride flush 0.9 % injection 5-40 mL (10 mLs IntraVENous Given 7/19/22 2014)   sodium chloride flush 0.9 % injection 5-40 mL (has no administration in time range)   0.9 % sodium chloride infusion ( IntraVENous Canceled Entry 7/19/22 2202)   ondansetron (ZOFRAN-ODT) disintegrating tablet 4 mg (has no administration in time range)     Or   ondansetron (ZOFRAN) injection 4 mg (has no administration in time range)   enoxaparin (LOVENOX) injection 40 mg (40 mg SubCUTAneous Given 7/19/22 1724)   morphine (PF) injection 2 mg ( IntraVENous See Alternative 7/20/22 0003)     Or   morphine injection 4 mg (4 mg IntraVENous Given 7/20/22 0003)   thiamine mononitrate tablet 100 mg (100 mg Oral Given 7/44/66 2974)   folic acid (FOLVITE) tablet 1 mg (1 mg Oral Given 7/19/22 1334)   pantoprazole (PROTONIX) injection 40 mg (40 mg IntraVENous Given 7/19/22 1333)   diazePAM (VALIUM) tablet 5 mg (has no administration in time range)     Or   diazePAM (VALIUM) injection 5 mg (has no administration in time range)     Or   diazePAM (VALIUM) tablet 10 mg (has no administration in time range)     Or   diazePAM (VALIUM) injection 10 mg (has no administration in time range)     Or   diazePAM (VALIUM) tablet 15 mg (has no administration in time range)     Or   diazePAM (VALIUM) injection 15 mg (has no administration in time range)     Or   diazePAM (VALIUM) tablet 20 mg (has no administration in time range)     Or   diazePAM (VALIUM) injection 20 mg (has no administration in time range)   ketorolac (TORADOL) injection 30 mg (30 mg IntraVENous Given 7/20/22 0210)   lactated ringers infusion ( IntraVENous New Bag 7/20/22 0150)   lactated ringers infusion 1,000 mL (1,000 mLs IntraVENous New Bag 7/19/22 0534)   ketorolac (TORADOL) injection 15 mg (15 mg IntraVENous Given 7/19/22 0527)   iopamidol (ISOVUE-370) 76 % injection 75 mL (75 mLs IntraVENous Given 7/19/22 0600)           Is this patient to be included in the SEP-1 Core Measure due to severe sepsis or septic shock? No   Exclusion criteria - the patient is NOT to be included for SEP-1 Core Measure due to:   Infection is not suspected    Admitted for acute pancreatitis and new onset diabetes  The patient tolerated their visit well. Thepatient and / or the family were informed of the results of any tests, a time was given to answer questions. FINAL IMPRESSION      1. Idiopathic acute pancreatitis without infection or necrosis    2. Diabetes mellitus, new onset ([facility]        DISPOSITION/PLAN   DISPOSITION Admitted 07/19/2022 06:54:47 AM      PATIENT REFERRED TO:  No follow-up provider specified.     DISCHARGE MEDICATIONS:  Current Discharge Medication List          DISCONTINUED MEDICATIONS:  Current Discharge Medication List                 (Please note that portions of this note were completed with a voice recognition program.  Efforts were made to edit the dictations but occasionally words aremis-transcribed.)    Radha Gandhi MD (electronically signed)           Radha Gandhi MD  07/20/22 3652

## 2022-07-20 PROBLEM — E61.2 MAGNESIUM DEFICIENCY: Status: ACTIVE | Noted: 2022-07-20

## 2022-07-20 PROBLEM — F10.10 ALCOHOL ABUSE: Status: ACTIVE | Noted: 2022-07-20

## 2022-07-20 PROBLEM — E11.65 TYPE 2 DIABETES MELLITUS WITH HYPERGLYCEMIA, WITHOUT LONG-TERM CURRENT USE OF INSULIN (HCC): Status: ACTIVE | Noted: 2022-07-20

## 2022-07-20 PROBLEM — E78.1 HYPERTRIGLYCERIDEMIA: Status: ACTIVE | Noted: 2022-07-20

## 2022-07-20 LAB
A/G RATIO: 1.2 (ref 1.1–2.2)
ALBUMIN SERPL-MCNC: 3.2 G/DL (ref 3.4–5)
ALP BLD-CCNC: 105 U/L (ref 40–129)
ALT SERPL-CCNC: 18 U/L (ref 10–40)
ANION GAP SERPL CALCULATED.3IONS-SCNC: 7 MMOL/L (ref 3–16)
AST SERPL-CCNC: 28 U/L (ref 15–37)
BASOPHILS ABSOLUTE: 0 K/UL (ref 0–0.2)
BASOPHILS RELATIVE PERCENT: 0.6 %
BILIRUB SERPL-MCNC: 2 MG/DL (ref 0–1)
BUN BLDV-MCNC: 9 MG/DL (ref 7–20)
CALCIUM SERPL-MCNC: 8.5 MG/DL (ref 8.3–10.6)
CHLORIDE BLD-SCNC: 97 MMOL/L (ref 99–110)
CHOLESTEROL, TOTAL: 132 MG/DL (ref 0–199)
CO2: 30 MMOL/L (ref 21–32)
CREAT SERPL-MCNC: <0.5 MG/DL (ref 0.9–1.3)
EOSINOPHILS ABSOLUTE: 0.2 K/UL (ref 0–0.6)
EOSINOPHILS RELATIVE PERCENT: 2.6 %
ESTIMATED AVERAGE GLUCOSE: 254.7 MG/DL
ESTIMATED AVERAGE GLUCOSE: 257.5 MG/DL
GFR AFRICAN AMERICAN: >60
GFR NON-AFRICAN AMERICAN: >60
GLUCOSE BLD-MCNC: 125 MG/DL (ref 70–99)
GLUCOSE BLD-MCNC: 126 MG/DL (ref 70–99)
GLUCOSE BLD-MCNC: 127 MG/DL (ref 70–99)
GLUCOSE BLD-MCNC: 132 MG/DL (ref 70–99)
GLUCOSE BLD-MCNC: 132 MG/DL (ref 70–99)
GLUCOSE BLD-MCNC: 139 MG/DL (ref 70–99)
GLUCOSE BLD-MCNC: 180 MG/DL (ref 70–99)
GLUCOSE BLD-MCNC: 276 MG/DL (ref 70–99)
HBA1C MFR BLD: 10.5 %
HBA1C MFR BLD: 10.6 %
HCT VFR BLD CALC: 34.1 % (ref 40.5–52.5)
HDLC SERPL-MCNC: 25 MG/DL (ref 40–60)
HEMOGLOBIN: 11.7 G/DL (ref 13.5–17.5)
L. PNEUMOPHILA SEROGP 1 UR AG: NORMAL
LDL CHOLESTEROL CALCULATED: ABNORMAL MG/DL
LDL CHOLESTEROL DIRECT: 13 MG/DL
LYMPHOCYTES ABSOLUTE: 1.6 K/UL (ref 1–5.1)
LYMPHOCYTES RELATIVE PERCENT: 25.6 %
MAGNESIUM: 1.7 MG/DL (ref 1.8–2.4)
MCH RBC QN AUTO: 32 PG (ref 26–34)
MCHC RBC AUTO-ENTMCNC: 34.5 G/DL (ref 31–36)
MCV RBC AUTO: 92.9 FL (ref 80–100)
MONOCYTES ABSOLUTE: 0.5 K/UL (ref 0–1.3)
MONOCYTES RELATIVE PERCENT: 8.5 %
NEUTROPHILS ABSOLUTE: 3.9 K/UL (ref 1.7–7.7)
NEUTROPHILS RELATIVE PERCENT: 62.7 %
PDW BLD-RTO: 12.8 % (ref 12.4–15.4)
PERFORMED ON: ABNORMAL
PHOSPHORUS: 2.4 MG/DL (ref 2.5–4.9)
PLATELET # BLD: 239 K/UL (ref 135–450)
PMV BLD AUTO: 7.8 FL (ref 5–10.5)
POTASSIUM SERPL-SCNC: 3.6 MMOL/L (ref 3.5–5.1)
RBC # BLD: 3.67 M/UL (ref 4.2–5.9)
SODIUM BLD-SCNC: 134 MMOL/L (ref 136–145)
TOTAL PROTEIN: 5.8 G/DL (ref 6.4–8.2)
TRIGL SERPL-MCNC: 656 MG/DL (ref 0–150)
TRIGL SERPL-MCNC: 743 MG/DL (ref 0–150)
TRIGL SERPL-MCNC: 760 MG/DL (ref 0–150)
VLDLC SERPL CALC-MCNC: ABNORMAL MG/DL
WBC # BLD: 6.2 K/UL (ref 4–11)

## 2022-07-20 PROCEDURE — 2000000000 HC ICU R&B

## 2022-07-20 PROCEDURE — 85025 COMPLETE CBC W/AUTO DIFF WBC: CPT

## 2022-07-20 PROCEDURE — C9113 INJ PANTOPRAZOLE SODIUM, VIA: HCPCS | Performed by: INTERNAL MEDICINE

## 2022-07-20 PROCEDURE — 2580000003 HC RX 258: Performed by: INTERNAL MEDICINE

## 2022-07-20 PROCEDURE — 6370000000 HC RX 637 (ALT 250 FOR IP): Performed by: INTERNAL MEDICINE

## 2022-07-20 PROCEDURE — 2580000003 HC RX 258: Performed by: PHYSICIAN ASSISTANT

## 2022-07-20 PROCEDURE — 80061 LIPID PANEL: CPT

## 2022-07-20 PROCEDURE — 6360000002 HC RX W HCPCS: Performed by: INTERNAL MEDICINE

## 2022-07-20 PROCEDURE — 84100 ASSAY OF PHOSPHORUS: CPT

## 2022-07-20 PROCEDURE — 80053 COMPREHEN METABOLIC PANEL: CPT

## 2022-07-20 PROCEDURE — 94760 N-INVAS EAR/PLS OXIMETRY 1: CPT

## 2022-07-20 PROCEDURE — 83735 ASSAY OF MAGNESIUM: CPT

## 2022-07-20 PROCEDURE — 83721 ASSAY OF BLOOD LIPOPROTEIN: CPT

## 2022-07-20 PROCEDURE — 36415 COLL VENOUS BLD VENIPUNCTURE: CPT

## 2022-07-20 RX ORDER — INSULIN LISPRO 100 [IU]/ML
0-4 INJECTION, SOLUTION INTRAVENOUS; SUBCUTANEOUS
Status: DISCONTINUED | OUTPATIENT
Start: 2022-07-20 | End: 2022-07-21 | Stop reason: HOSPADM

## 2022-07-20 RX ORDER — SODIUM CHLORIDE, SODIUM LACTATE, POTASSIUM CHLORIDE, CALCIUM CHLORIDE 600; 310; 30; 20 MG/100ML; MG/100ML; MG/100ML; MG/100ML
INJECTION, SOLUTION INTRAVENOUS CONTINUOUS
Status: DISCONTINUED | OUTPATIENT
Start: 2022-07-20 | End: 2022-07-21 | Stop reason: HOSPADM

## 2022-07-20 RX ORDER — DEXTROSE, SODIUM CHLORIDE, SODIUM LACTATE, POTASSIUM CHLORIDE, AND CALCIUM CHLORIDE 5; .6; .31; .03; .02 G/100ML; G/100ML; G/100ML; G/100ML; G/100ML
INJECTION, SOLUTION INTRAVENOUS CONTINUOUS
Status: DISCONTINUED | OUTPATIENT
Start: 2022-07-20 | End: 2022-07-20

## 2022-07-20 RX ORDER — OMEGA-3-ACID ETHYL ESTERS 1 G/1
2 CAPSULE, LIQUID FILLED ORAL 2 TIMES DAILY
Status: DISCONTINUED | OUTPATIENT
Start: 2022-07-20 | End: 2022-07-20 | Stop reason: RX

## 2022-07-20 RX ORDER — INSULIN LISPRO 100 [IU]/ML
0-6 INJECTION, SOLUTION INTRAVENOUS; SUBCUTANEOUS
Status: DISCONTINUED | OUTPATIENT
Start: 2022-07-20 | End: 2022-07-20 | Stop reason: SDUPTHER

## 2022-07-20 RX ORDER — MAGNESIUM SULFATE IN WATER 40 MG/ML
2000 INJECTION, SOLUTION INTRAVENOUS ONCE
Status: COMPLETED | OUTPATIENT
Start: 2022-07-20 | End: 2022-07-20

## 2022-07-20 RX ORDER — INSULIN LISPRO 100 [IU]/ML
0-4 INJECTION, SOLUTION INTRAVENOUS; SUBCUTANEOUS NIGHTLY
Status: DISCONTINUED | OUTPATIENT
Start: 2022-07-20 | End: 2022-07-21 | Stop reason: HOSPADM

## 2022-07-20 RX ORDER — INSULIN GLARGINE 100 [IU]/ML
0.25 INJECTION, SOLUTION SUBCUTANEOUS NIGHTLY
Status: DISCONTINUED | OUTPATIENT
Start: 2022-07-20 | End: 2022-07-21 | Stop reason: HOSPADM

## 2022-07-20 RX ORDER — DEXTROSE MONOHYDRATE 100 MG/ML
INJECTION, SOLUTION INTRAVENOUS CONTINUOUS PRN
Status: DISCONTINUED | OUTPATIENT
Start: 2022-07-20 | End: 2022-07-21 | Stop reason: HOSPADM

## 2022-07-20 RX ADMIN — FOLIC ACID 1 MG: 1 TABLET ORAL at 10:57

## 2022-07-20 RX ADMIN — SODIUM CHLORIDE, SODIUM LACTATE, POTASSIUM CHLORIDE, CALCIUM CHLORIDE AND DEXTROSE MONOHYDRATE: 5; 600; 310; 30; 20 INJECTION, SOLUTION INTRAVENOUS at 14:29

## 2022-07-20 RX ADMIN — KETOROLAC TROMETHAMINE 30 MG: 30 INJECTION, SOLUTION INTRAMUSCULAR at 15:21

## 2022-07-20 RX ADMIN — SODIUM CHLORIDE, POTASSIUM CHLORIDE, SODIUM LACTATE AND CALCIUM CHLORIDE: 600; 310; 30; 20 INJECTION, SOLUTION INTRAVENOUS at 08:55

## 2022-07-20 RX ADMIN — MORPHINE SULFATE 2 MG: 2 INJECTION, SOLUTION INTRAMUSCULAR; INTRAVENOUS at 21:00

## 2022-07-20 RX ADMIN — KETOROLAC TROMETHAMINE 30 MG: 30 INJECTION, SOLUTION INTRAMUSCULAR at 08:54

## 2022-07-20 RX ADMIN — MORPHINE SULFATE 4 MG: 4 INJECTION, SOLUTION INTRAMUSCULAR; INTRAVENOUS at 05:19

## 2022-07-20 RX ADMIN — ENOXAPARIN SODIUM 40 MG: 100 INJECTION SUBCUTANEOUS at 10:57

## 2022-07-20 RX ADMIN — SODIUM CHLORIDE, POTASSIUM CHLORIDE, SODIUM LACTATE AND CALCIUM CHLORIDE: 600; 310; 30; 20 INJECTION, SOLUTION INTRAVENOUS at 01:50

## 2022-07-20 RX ADMIN — SODIUM CHLORIDE, POTASSIUM CHLORIDE, SODIUM LACTATE AND CALCIUM CHLORIDE: 600; 310; 30; 20 INJECTION, SOLUTION INTRAVENOUS at 21:56

## 2022-07-20 RX ADMIN — SODIUM CHLORIDE, SODIUM LACTATE, POTASSIUM CHLORIDE, CALCIUM CHLORIDE AND DEXTROSE MONOHYDRATE: 5; 600; 310; 30; 20 INJECTION, SOLUTION INTRAVENOUS at 12:33

## 2022-07-20 RX ADMIN — KETOROLAC TROMETHAMINE 30 MG: 30 INJECTION, SOLUTION INTRAMUSCULAR at 02:10

## 2022-07-20 RX ADMIN — INSULIN GLARGINE 16 UNITS: 100 INJECTION, SOLUTION SUBCUTANEOUS at 20:59

## 2022-07-20 RX ADMIN — MORPHINE SULFATE 4 MG: 4 INJECTION, SOLUTION INTRAMUSCULAR; INTRAVENOUS at 00:03

## 2022-07-20 RX ADMIN — PANTOPRAZOLE SODIUM 40 MG: 40 INJECTION, POWDER, FOR SOLUTION INTRAVENOUS at 10:57

## 2022-07-20 RX ADMIN — THIAMINE HCL TAB 100 MG 100 MG: 100 TAB at 10:57

## 2022-07-20 RX ADMIN — MORPHINE SULFATE 4 MG: 4 INJECTION, SOLUTION INTRAMUSCULAR; INTRAVENOUS at 12:00

## 2022-07-20 RX ADMIN — MAGNESIUM SULFATE HEPTAHYDRATE 2000 MG: 40 INJECTION, SOLUTION INTRAVENOUS at 11:06

## 2022-07-20 ASSESSMENT — PAIN DESCRIPTION - LOCATION
LOCATION: ABDOMEN

## 2022-07-20 ASSESSMENT — PAIN SCALES - GENERAL
PAINLEVEL_OUTOF10: 7
PAINLEVEL_OUTOF10: 8
PAINLEVEL_OUTOF10: 9
PAINLEVEL_OUTOF10: 8
PAINLEVEL_OUTOF10: 7
PAINLEVEL_OUTOF10: 9
PAINLEVEL_OUTOF10: 9
PAINLEVEL_OUTOF10: 5

## 2022-07-20 ASSESSMENT — PAIN DESCRIPTION - DESCRIPTORS
DESCRIPTORS: ACHING

## 2022-07-20 ASSESSMENT — PAIN DESCRIPTION - ORIENTATION
ORIENTATION: MID

## 2022-07-20 NOTE — PROGRESS NOTES
Hospitalist Progress Note      PCP: No primary care provider on file. Date of Admission: 7/19/2022    Chief Complaint: Abdominal pain    Hospital Course: Taylor Haynes is a 37 y.o. male with history of acute pancreatitis 3 years ago, alcohol abuse, NIDDM presented with c/o severe, constant epigastric back pain. Onset 9 PM yesterday, radiating to the back, no aggravating or relieving factors, associated with nausea and one episode of nonbilious, nonbloody vomiting. Last drink was 4-5 days ago, had 8-10 beers. He denied any fevers, chills, diarrhea, chest pain or shortness of breath. In the ED CT done showed acute pancreatitis. Lipase of 112. He was started on IV fluids and pain control and admitted for further management. Subjective: . Patient seen and examined. Triglyceride levels noted to be high. Transferred to ICU for insulin drip. Still with abdominal pain. No nausea or vomiting.         Medications:  Reviewed    Infusion Medications    insulin      dextrose 5% in lactated ringers      dextrose      sodium chloride       Scheduled Medications    magnesium sulfate  2,000 mg IntraVENous Once    sodium chloride flush  5-40 mL IntraVENous 2 times per day    enoxaparin  40 mg SubCUTAneous Daily    thiamine  100 mg Oral Daily    folic acid  1 mg Oral Daily    pantoprazole  40 mg IntraVENous Daily     PRN Meds: dextrose bolus **OR** dextrose bolus, glucagon (rDNA), dextrose, sodium chloride flush, sodium chloride, ondansetron **OR** ondansetron, morphine **OR** morphine, diazePAM **OR** diazePAM **OR** diazePAM **OR** diazePAM **OR** diazePAM **OR** diazePAM **OR** diazePAM **OR** diazePAM, ketorolac      Intake/Output Summary (Last 24 hours) at 7/20/2022 1103  Last data filed at 7/20/2022 0932  Gross per 24 hour   Intake 1075 ml   Output 1400 ml   Net -325 ml       Physical Exam Performed:    /79   Pulse 73   Temp 97.2 °F (36.2 °C) (Temporal)   Resp 13   Ht 5' 0.63\" (1.54 m) Wt 138 lb 14.2 oz (63 kg)   SpO2 100%   BMI 26.56 kg/m²     General appearance: No apparent distress, appears stated age and cooperative. HEENT: Pupils equal, round, and reactive to light. Conjunctivae clear. Neck: Supple, with full range of motion. No jugular venous distention. Trachea midline. Respiratory:  Normal respiratory effort. Clear to auscultation, bilaterally without Rales/Wheezes/Rhonchi. Cardiovascular: Regular rate and rhythm with normal S1/S2 without murmurs, rubs or gallops. Abdomen: Soft, epigastric tenderness, Non-distended with normal bowel sounds. Musculoskeletal: No clubbing, cyanosis or edema bilaterally. Full range of motion without deformity. Skin: Skin color, texture, turgor normal.  No rashes or lesions. Neurologic:  Neurovascularly intact without any focal sensory/motor deficits. Cranial nerves: II-XII intact, grossly non-focal.  Psychiatric: Alert and oriented, thought content appropriate, normal insight  Capillary Refill: Brisk,3 seconds, normal   Peripheral Pulses: +2 palpable, equal bilaterally       Labs:   Recent Labs     07/19/22  1145 07/19/22  1502 07/20/22  0508   WBC 6.8 10.4 6.2   HGB 13.3* 12.9* 11.7*   HCT 38.3* 37.4* 34.1*    312 239     Recent Labs     07/19/22  1145 07/19/22  1502 07/20/22  0508    138 134*   K 4.2 4.3 3.6   CL 94* 98* 97*   CO2 30 30 30   BUN 18 16 9   CREATININE 0.8* 0.7* <0.5*   CALCIUM 9.1 8.8 8.5   PHOS  --   --  2.4*     Recent Labs     07/19/22  1145 07/19/22  1502 07/20/22  0508   AST 31 35 28   ALT 23 23 18   BILITOT 0.7 0.7 2.0*   ALKPHOS 128 113 105     No results for input(s): INR in the last 72 hours. Recent Labs     07/19/22  0506 07/19/22  1145 07/19/22  1502   TROPONINI <0.01 <0.01 <0.01       Urinalysis:    No results found for: Agustín Scarce, BACTERIA, RBCUA, BLOODU, SPECGRAV, GLUCOSEU    Radiology:  [address] organ?  LIVER, GALLBLADDER   Final Result   No cholelithiasis or cholecystitis      Fatty liver.      Nodular density seen in the liver on recent CT scan is not clearly seen by   ultrasound      RECOMMENDATIONS:   Unavailable         CT ABDOMEN PELVIS W IV CONTRAST Additional Contrast? None   Final Result   Ill-defined peripancreatic fluid and edema, compatible with acute   pancreatitis. No drainable pseudocyst noted      Fatty liver. Hyperdense nodule is seen peripherally in the right hepatic   lobe. Most commonly this represents focal fatty sparing or hemangioma in a   patient of this age. Consider follow-up non urgent abdominal MRI, with and   without IV contrast, liver protocol for further characterization      No obvious gallstones noted on CT      Punctate pulmonary nodule left lower lobe. Please see follow-up   recommendations below      Guidelines for follow-up and management of pulmonary nodules found on abdomen   CT:      <6 mm - No follow up recommend on the basis of the estimated low risk of   malignancy. Radiology 2017 http://pubs. rsna.org/doi/full/10.1148/radiol. 5421034086                 Assessment/Plan:    Active Hospital Problems    Diagnosis     Hypertriglyceridemia [E78.1]      Priority: Medium    Alcohol abuse [F10.10]      Priority: Medium    Magnesium deficiency [E61.2]      Priority: Medium    Type 2 diabetes mellitus with hyperglycemia, without long-term current use of insulin (HCC) [E11.65]      Priority: Medium    Acute pancreatitis without infection or necrosis, unspecified pancreatitis type [K85.90]      Priority: Medium       Acute pancreatitis due to alcohol abuse and hypertriglyceridemia:  CT abdomen and pelvis: Ill-defined peripancreatic fluid and edema compatible with acute pancreatitis. RUQ US: No cholecystitis or c cholelithiasis. Continue IV fluids. Monitor BUN/hematocrit. NPO. Pain control. GI consulted: Appreciate input.  Recommended EUS in 6 weeks to follow-up on pancreatitis pancreatic ductal dilatation and liver lesion noted on CT.      Hypertriglyceridemia:  Started on insulin drip with dextrose infusion. Monitor triglycerides level every 12 until< 500. Monitor fingersticks every. Lovaza oral intake resumes. Alcohol abuse:  Cessation counseling provided. Social work consult to provide resources. Folate, thiamine and multivitamin. Monitor on CIWA protocol. T2 DM not on long-term insulin with hyperglycemia:  Follow-up A1c. Monitor blood glucose on CDI. Hepatic Nodule: Incidental finding on CT. MRI liver protocol recommended. Thank you        Pulmonary Nodule:  <6 mm in the left lower lobe. No follow-up recommended.       DVT Prophylaxis: Lovenox  Diet: Diet NPO Exceptions are: Ice Chips  Code Status: Full Code    PT/OT Eval Status: Independent    Dispo -Home once medically stable    Jeremy Carrizales MD

## 2022-07-20 NOTE — PROGRESS NOTES
Report called to ICU. Pt transferring to room 3912. Pt informed of transfer, all questions addressed and answered,  utilized. PRN Toradol administered for pain 8/10.

## 2022-07-20 NOTE — PROGRESS NOTES
Gastroenterology Progress Note      Cecilia Vasquez is a 37 y.o. male patient. 1. Idiopathic acute pancreatitis without infection or necrosis    2. Diabetes mellitus, new onset ([address]        SUBJECTIVE:  reports less pain today. ROS:  Cardiovascular ROS: no chest pain or dyspnea on exertion  Gastrointestinal ROS: see hpi  Respiratory ROS: no cough, shortness of breath, or wheezing    Physical    VITALS:  /79   Pulse 73   Temp 97.2 °F (36.2 °C) (Temporal)   Resp 16   Ht 5' 0.63\" (1.54 m)   Wt 138 lb 14.2 oz (63 kg)   SpO2 100%   BMI 26.56 kg/m²   TEMPERATURE:  Current - Temp: 97.2 °F (36.2 °C); Max - Temp  Av.9 °F (36.6 °C)  Min: 97.2 °F (36.2 °C)  Max: 98.5 °F (36.9 °C)    NAD  RRR, Nl s1s2  Lungs CTA Bilaterally, normal effort  Abdomen soft, ND, mild epigastric tenderness, no HSM, Bowel sounds normal  AAOx3, No asterixis     Data    Data Review:    Recent Labs     22  1145 22  1502 22  0508   WBC 6.8 10.4 6.2   HGB 13.3* 12.9* 11.7*   HCT 38.3* 37.4* 34.1*   MCV 91.8 92.1 92.9    312 239     Recent Labs     22  1145 22  1502 22  0508    138 134*   K 4.2 4.3 3.6   CL 94* 98* 97*   CO2 30 30 30   PHOS  --   --  2.4*   BUN 18 16 9   CREATININE 0.8* 0.7* <0.5*     Recent Labs     22  1145 22  1502 22  0508   AST 31 35 28   ALT 23 23 18   BILITOT 0.7 0.7 2.0*   ALKPHOS 128 113 105     Recent Labs     22  0506   LIPASE 112.0*     No results for input(s): PROTIME, INR in the last 72 hours. No results for input(s): PTT in the last 72 hours. ASSESSMENT     Acute pancreatitis -lipase 112 with CT evidence of pancreatitis. CT also with mild pancreatic ductal dilation and calcifications in the pancreas which can be seen with chronic pancreatitis. History of acute pancreatitis in the past felt to be from alcohol. Cut back and currently drinking a sixpack of beer on the weekend.   No gallstones or biliary dilation on ultrasound. No meds at home. Normal calcium. TG was 1,038. He was moved to ICU for insulin drip but was not started and TG now 743. Less pain today. Good UOP. Abnormal liver on CT -CT with fatty liver and hyperdense nodule which could be fatty sparing or hemangioma but MRI recommended to further characterize this. PLAN    -trial of clear liquids  -IV fluids    -Intake and output  -Supportive care  -treatment of high TG per primary team  -Discussed with patient the importance of complete alcohol cessation  -plan EUS in 6 weeks to f/u pancreatitis, pancreatic ductal dilation and liver lesion noted on CT     Discussed with Dr. Dano Quezada PASharonaC  [facility]    I have personally performed a face to face diagnostic evaluation on this patient. I have interviewed and examined the patient and I agree with the findings and recommended plan of care. In summary, my findings and plan are the following:     Patient transferred to ICU for insulin gtt. His TG was >1000. Abdominal pain is better today and he is tolerating clears. Abdomen epigastric tenderness. Continue IVF and IV insulin gtt (per hospitalist). Once TG<500, may start Gemfibrozil 600 mg and low fat diet to control high TG in the long run. Slowly advance diet.  Schedule OP EUS in 6-8 weeks with Dr. Brian Pulliam MD, MSc  [facility]  07/20/22

## 2022-07-21 VITALS
SYSTOLIC BLOOD PRESSURE: 127 MMHG | WEIGHT: 136.91 LBS | HEIGHT: 61 IN | HEART RATE: 73 BPM | RESPIRATION RATE: 16 BRPM | OXYGEN SATURATION: 100 % | DIASTOLIC BLOOD PRESSURE: 74 MMHG | TEMPERATURE: 97.1 F | BODY MASS INDEX: 25.85 KG/M2

## 2022-07-21 PROBLEM — R91.1 PULMONARY NODULE: Status: ACTIVE | Noted: 2022-07-21

## 2022-07-21 PROBLEM — K86.89 PANCREATIC DUCT DILATED: Status: ACTIVE | Noted: 2022-07-21

## 2022-07-21 PROBLEM — K76.89 NODULE ON LIVER: Status: ACTIVE | Noted: 2022-07-21

## 2022-07-21 LAB
A/G RATIO: 1.4 (ref 1.1–2.2)
ALBUMIN SERPL-MCNC: 3.2 G/DL (ref 3.4–5)
ALP BLD-CCNC: 94 U/L (ref 40–129)
ALT SERPL-CCNC: 14 U/L (ref 10–40)
ANION GAP SERPL CALCULATED.3IONS-SCNC: 1 MMOL/L (ref 3–16)
AST SERPL-CCNC: 22 U/L (ref 15–37)
BASOPHILS ABSOLUTE: 0 K/UL (ref 0–0.2)
BASOPHILS RELATIVE PERCENT: 0.3 %
BILIRUB SERPL-MCNC: 1.1 MG/DL (ref 0–1)
BUN BLDV-MCNC: 3 MG/DL (ref 7–20)
CALCIUM SERPL-MCNC: 8.4 MG/DL (ref 8.3–10.6)
CHLORIDE BLD-SCNC: 102 MMOL/L (ref 99–110)
CO2: 35 MMOL/L (ref 21–32)
CREAT SERPL-MCNC: <0.5 MG/DL (ref 0.9–1.3)
EOSINOPHILS ABSOLUTE: 0.2 K/UL (ref 0–0.6)
EOSINOPHILS RELATIVE PERCENT: 4.1 %
GFR AFRICAN AMERICAN: >60
GFR NON-AFRICAN AMERICAN: >60
GLUCOSE BLD-MCNC: 112 MG/DL (ref 70–99)
GLUCOSE BLD-MCNC: 153 MG/DL (ref 70–99)
GLUCOSE BLD-MCNC: 274 MG/DL (ref 70–99)
HCT VFR BLD CALC: 32.4 % (ref 40.5–52.5)
HEMOGLOBIN: 11 G/DL (ref 13.5–17.5)
LYMPHOCYTES ABSOLUTE: 1.2 K/UL (ref 1–5.1)
LYMPHOCYTES RELATIVE PERCENT: 23.3 %
MAGNESIUM: 1.9 MG/DL (ref 1.8–2.4)
MCH RBC QN AUTO: 31.4 PG (ref 26–34)
MCHC RBC AUTO-ENTMCNC: 34 G/DL (ref 31–36)
MCV RBC AUTO: 92.3 FL (ref 80–100)
MONOCYTES ABSOLUTE: 0.6 K/UL (ref 0–1.3)
MONOCYTES RELATIVE PERCENT: 10.7 %
NEUTROPHILS ABSOLUTE: 3.2 K/UL (ref 1.7–7.7)
NEUTROPHILS RELATIVE PERCENT: 61.6 %
PDW BLD-RTO: 12.6 % (ref 12.4–15.4)
PERFORMED ON: ABNORMAL
PERFORMED ON: ABNORMAL
PHOSPHORUS: 2.5 MG/DL (ref 2.5–4.9)
PLATELET # BLD: 205 K/UL (ref 135–450)
PMV BLD AUTO: 7.4 FL (ref 5–10.5)
POTASSIUM SERPL-SCNC: 3.7 MMOL/L (ref 3.5–5.1)
RBC # BLD: 3.51 M/UL (ref 4.2–5.9)
SODIUM BLD-SCNC: 138 MMOL/L (ref 136–145)
TOTAL PROTEIN: 5.5 G/DL (ref 6.4–8.2)
TRIGL SERPL-MCNC: 352 MG/DL (ref 0–150)
WBC # BLD: 5.2 K/UL (ref 4–11)

## 2022-07-21 PROCEDURE — 6360000002 HC RX W HCPCS: Performed by: INTERNAL MEDICINE

## 2022-07-21 PROCEDURE — 2580000003 HC RX 258: Performed by: INTERNAL MEDICINE

## 2022-07-21 PROCEDURE — 6370000000 HC RX 637 (ALT 250 FOR IP): Performed by: INTERNAL MEDICINE

## 2022-07-21 PROCEDURE — 84100 ASSAY OF PHOSPHORUS: CPT

## 2022-07-21 PROCEDURE — 84478 ASSAY OF TRIGLYCERIDES: CPT

## 2022-07-21 PROCEDURE — 85025 COMPLETE CBC W/AUTO DIFF WBC: CPT

## 2022-07-21 PROCEDURE — 36415 COLL VENOUS BLD VENIPUNCTURE: CPT

## 2022-07-21 PROCEDURE — C9113 INJ PANTOPRAZOLE SODIUM, VIA: HCPCS | Performed by: INTERNAL MEDICINE

## 2022-07-21 PROCEDURE — 80053 COMPREHEN METABOLIC PANEL: CPT

## 2022-07-21 PROCEDURE — 83735 ASSAY OF MAGNESIUM: CPT

## 2022-07-21 RX ORDER — INSULIN GLARGINE 100 [IU]/ML
20 INJECTION, SOLUTION SUBCUTANEOUS NIGHTLY
Qty: 5 PEN | Refills: 0 | Status: SHIPPED | OUTPATIENT
Start: 2022-07-21

## 2022-07-21 RX ORDER — BLOOD PRESSURE TEST KIT
1 KIT MISCELLANEOUS 2 TIMES DAILY
Qty: 100 EACH | Refills: 1 | Status: SHIPPED | OUTPATIENT
Start: 2022-07-21

## 2022-07-21 RX ORDER — GLUCOSAMINE HCL/CHONDROITIN SU 500-400 MG
CAPSULE ORAL
Qty: 2 STRIP | Refills: 0 | Status: SHIPPED | OUTPATIENT
Start: 2022-07-21

## 2022-07-21 RX ORDER — BLOOD-GLUCOSE METER
1 KIT MISCELLANEOUS DAILY
Qty: 1 KIT | Refills: 0 | Status: SHIPPED | OUTPATIENT
Start: 2022-07-21

## 2022-07-21 RX ORDER — GEMFIBROZIL 600 MG/1
600 TABLET, FILM COATED ORAL
Qty: 60 TABLET | Refills: 3 | Status: SHIPPED | OUTPATIENT
Start: 2022-07-21 | End: 2022-08-25 | Stop reason: SDUPTHER

## 2022-07-21 RX ORDER — GEMFIBROZIL 600 MG/1
600 TABLET, FILM COATED ORAL
Status: DISCONTINUED | OUTPATIENT
Start: 2022-07-21 | End: 2022-07-21 | Stop reason: HOSPADM

## 2022-07-21 RX ORDER — LANCETS 30 GAUGE
1 EACH MISCELLANEOUS 2 TIMES DAILY
Qty: 200 EACH | Refills: 0 | Status: SHIPPED | OUTPATIENT
Start: 2022-07-21

## 2022-07-21 RX ADMIN — THIAMINE HCL TAB 100 MG 100 MG: 100 TAB at 08:01

## 2022-07-21 RX ADMIN — ENOXAPARIN SODIUM 40 MG: 100 INJECTION SUBCUTANEOUS at 08:00

## 2022-07-21 RX ADMIN — GEMFIBROZIL 600 MG: 600 TABLET ORAL at 09:16

## 2022-07-21 RX ADMIN — INSULIN LISPRO 2 UNITS: 100 INJECTION, SOLUTION INTRAVENOUS; SUBCUTANEOUS at 12:02

## 2022-07-21 RX ADMIN — PANTOPRAZOLE SODIUM 40 MG: 40 INJECTION, POWDER, FOR SOLUTION INTRAVENOUS at 08:01

## 2022-07-21 RX ADMIN — SODIUM CHLORIDE, POTASSIUM CHLORIDE, SODIUM LACTATE AND CALCIUM CHLORIDE: 600; 310; 30; 20 INJECTION, SOLUTION INTRAVENOUS at 04:40

## 2022-07-21 RX ADMIN — GEMFIBROZIL 600 MG: 600 TABLET ORAL at 16:07

## 2022-07-21 RX ADMIN — KETOROLAC TROMETHAMINE 30 MG: 30 INJECTION, SOLUTION INTRAMUSCULAR at 06:44

## 2022-07-21 RX ADMIN — FOLIC ACID 1 MG: 1 TABLET ORAL at 08:01

## 2022-07-21 RX ADMIN — KETOROLAC TROMETHAMINE 30 MG: 30 INJECTION, SOLUTION INTRAMUSCULAR at 00:08

## 2022-07-21 RX ADMIN — SODIUM CHLORIDE, POTASSIUM CHLORIDE, SODIUM LACTATE AND CALCIUM CHLORIDE: 600; 310; 30; 20 INJECTION, SOLUTION INTRAVENOUS at 13:44

## 2022-07-21 ASSESSMENT — PAIN SCALES - GENERAL: PAINLEVEL_OUTOF10: 6

## 2022-07-21 NOTE — PROGRESS NOTES
[facility] Diabetes Education Nurse  Consult Note       NAME:  Kelsea Olmedo  MEDICAL RECORD NUMBER:  [id number]  AGE: 37 y.o. GENDER: male  : 1980  TODAY'S DATE:  2022    Subjective:     Reason for Educator consult ---  Evaluation and Assessment:    Kelsea Olmedo is a 37 y.o. male referred by:   [x] Physician  [] Nursing  [] Other:      Pt seen for DM education. Video  Makenna Luz #[id number] interpreted during session. Pt states he's been told in the past he's had high blood sugar but \"not too bad\". Pt states he has used a home glucose meter before. Pt c/o increased thirst, frequent urination, and blurred vision prior to admission. Discussed and provided handouts on s/s and treatment of hypoglycemia and hyperglycemia. Discussed DM med regimen prescribed for discharge. Instructed on use of insulin pen with handout and demonstration. Pt performed successful practice injection into practice pad with minimal verbal prompts. Pt is aware of S/S of hyper- and hypoglycemia and the proper treatment of hyper- and hypoglycemia. Provided booklet titled \"Living well with diabetes\" written in Ukrainian. Pt states he is able to read in Ukrainian. PAST MEDICAL HISTORY      Diagnosis Date    Acute pancreatitis     Diabetes mellitus ([facility] [address]        PAST SURGICAL HISTORY  Past Surgical History:   Procedure Laterality Date    APPENDECTOMY         FAMILY HISTORY  Family History   Problem Relation Age of Onset    No Known Problems Mother     No Known Problems Father        SOCIAL HISTORY  Social History     Tobacco Use    Smoking status: Never    Smokeless tobacco: Never   Substance Use Topics    Drug use: No       ALLERGIES  No Known Allergies    MEDICATIONS  No current facility-administered medications on file prior to encounter.      Current Outpatient Medications on File Prior to Encounter   Medication Sig Dispense Refill    [DISCONTINUED] ibuprofen (IBU) 800 MG tablet Take 1 tablet by mouth every 6 hours as needed for Pain.  120 tablet 0       Objective:     LABS    CBC:   Lab Results   Component Value Date/Time    WBC 5.2 07/21/2022 04:23 AM    RBC 3.51 07/21/2022 04:23 AM    HGB 11.0 07/21/2022 04:23 AM    HCT 32.4 07/21/2022 04:23 AM    MCV 92.3 07/21/2022 04:23 AM    MCH 31.4 07/21/2022 04:23 AM    MCHC 34.0 07/21/2022 04:23 AM    RDW 12.6 07/21/2022 04:23 AM     07/21/2022 04:23 AM    MPV 7.4 07/21/2022 04:23 AM     CMP:    Lab Results   Component Value Date/Time     07/21/2022 04:23 AM    K 3.7 07/21/2022 04:23 AM    K 4.3 07/19/2022 03:02 PM     07/21/2022 04:23 AM    CO2 35 07/21/2022 04:23 AM    BUN 3 07/21/2022 04:23 AM    CREATININE <0.5 07/21/2022 04:23 AM    GFRAA >60 07/21/2022 04:23 AM    AGRATIO 1.4 07/21/2022 04:23 AM    LABGLOM >60 07/21/2022 04:23 AM    GLUCOSE 153 07/21/2022 04:23 AM    PROT 5.5 07/21/2022 04:23 AM    LABALBU 3.2 07/21/2022 04:23 AM    CALCIUM 8.4 07/21/2022 04:23 AM    BILITOT 1.1 07/21/2022 04:23 AM    ALKPHOS 94 07/21/2022 04:23 AM    AST 22 07/21/2022 04:23 AM    ALT 14 07/21/2022 04:23 AM       HgBA1c:    Lab Results   Component Value Date/Time    LABA1C 10.6 07/19/2022 03:02 PM       This patient's last creatinine was   Recent Labs     07/21/22  0423   CREATININE <0.5*        Recent Blood sugars have been   Lab Results   Component Value Date/Time    POCGLU 112 07/21/2022 07:55 AM    POCGLU 276 07/20/2022 08:51 PM    POCGLU 180 07/20/2022 06:05 PM    POCGLU 139 07/20/2022 02:27 PM    POCGLU 127 07/20/2022 12:32 PM    POCGLU 125 07/20/2022 08:03 AM    POCGLU 126 07/20/2022 04:40 AM    POCGLU 132 07/20/2022 12:54 AM     Lab Results   Component Value Date/Time    GLUCOSE 153 07/21/2022 04:23 AM    GLUCOSE 132 07/20/2022 05:08 AM    GLUCOSE 155 07/19/2022 03:02 PM    GLUCOSE 206 07/19/2022 11:45 AM    GLUCOSE 364 07/19/2022 05:06 AM            Assessment:     Patient Active Problem List   Diagnosis    Acute pancreatitis without infection or necrosis, unspecified pancreatitis type    Hypertriglyceridemia    Alcohol abuse    Magnesium deficiency    Type 2 diabetes mellitus with hyperglycemia, without long-term current use of insulin (HCC)    Nodule on liver    Pancreatic duct dilated    Pulmonary nodule       Plan:     Plan of Care:   Leonora meds per financial counselor  Continue to monitor blood sugars as ordered  Primary nurse states discharge planned for this evening if pt tolerates diet  Will follow up for glucometer education when it is delivered by pharmacy    Discharge Plan:  Patient will need insulin for home which will be new  Pt has information for follow up at [facility] clinic      Naila Renteria MSN, RN, [facility]  Certified Diabetes Care and

## 2022-07-21 NOTE — DISCHARGE INSTR - COC
Continuity of Care Form    Patient Name: Sugey Irizarry   :  1980  MRN:  [id number]    Admit date:  2022  Discharge date:  ***    Code Status Order: Full Code   Advance Directives:     Admitting Physician:  Amandeep Hall MD  PCP: No primary care provider on file. Discharging Nurse: [facility] Unit/Room#: VDC-7835/1642-19  Discharging Unit Phone Number: ***    Emergency Contact:   Extended Emergency Contact Information  Primary Emergency Contact: [facility]  Home Phone: [phone]  Relation: Other    Past Surgical History:  Past Surgical History:   Procedure Laterality Date    APPENDECTOMY         Immunization History: There is no immunization history on file for this patient.     Active Problems:  Patient Active Problem List   Diagnosis Code    Acute pancreatitis without infection or necrosis, unspecified pancreatitis type K85.90    Hypertriglyceridemia E78.1    Alcohol abuse F10.10    Magnesium deficiency E61.2    Type 2 diabetes mellitus with hyperglycemia, without long-term current use of insulin ([facility] [address] E11.65    Nodule on liver K76.89       Isolation/Infection:   Isolation            No Isolation          Patient Infection Status       None to display            Nurse Assessment:  Last Vital Signs: /73   Pulse 63   Temp 97.9 °F (36.6 °C) (Temporal)   Resp 10   Ht 5' 0.63\" (1.54 m)   Wt 136 lb 14.5 oz (62.1 kg)   SpO2 97%   BMI 26.18 kg/m²     Last documented pain score (0-10 scale): Pain Level: 6  Last Weight:   Wt Readings from Last 1 Encounters:   22 136 lb 14.5 oz (62.1 kg)     Mental Status:  {IP PT MENTAL STATUS:}    IV Access:  { LUIS IV ACCESS:160748401}    Nursing Mobility/ADLs:  Walking   {CHP DME FKTZ:976060266}  Transfer  {CHP DME SZBB:486213252}  Bathing  {CHP DME OWOS:987413784}  Dressing  {CHP DME IINI:754252464}  Toileting  {CHP DME KOOI:603345370}  Feeding  {CHP DME ICDQ:006691831}  Med Admin  {CHP DME STAR:586133431}  Med Delivery   [address] [employer] MED Delivery:439592952}    Wound Care Documentation and Therapy:        Elimination:  Continence: Bowel: {YES / NW:23460}  Bladder: {YES / UL:17162}  Urinary Catheter: {Urinary Catheter:554552791}   Colostomy/Ileostomy/Ileal Conduit: {YES / WZ:86205}       Date of Last BM: ***    Intake/Output Summary (Last 24 hours) at 2022 1050  Last data filed at 2022 0616  Gross per 24 hour   Intake 6585.3 ml   Output 2175 ml   Net 4410.3 ml     I/O last 3 completed shifts: In: 6585.3 [I.V.:6540.1;  IV Piggyback:45.2]  Out: 2975 [Urine:2975]    Safety Concerns:     [address] [employer] Safety Concerns:608144039}    Impairments/Disabilities:      [address] [employer] Impairments/Disabilities:311405940}    Nutrition Therapy:  Current Nutrition Therapy:   [address] [employer] Diet List:765415457}    Routes of Feeding: {P DME Other Feedings:890353333}  Liquids: {Slp liquid thickness:70196}  Daily Fluid Restriction: {CHP DME Yes amt example:257383961}  Last Modified Barium Swallow with Video (Video Swallowing Test): {Done Not Done XGLD:127629980}    Treatments at the Time of Hospital Discharge:   Respiratory Treatments: ***  Oxygen Therapy:  {Therapy; copd oxygen:22433}  Ventilator:    { CC Vent VGKS:012564177}    Rehab Therapies: {THERAPEUTIC INTERVENTION:9606250902}  Weight Bearing Status/Restrictions: [address] [employer]  Weight Bearin}  Other Medical Equipment (for information only, NOT a DME order):  {EQUIPMENT:060136837}  Other Treatments: ***    Patient's personal belongings (please select all that are sent with patient):  {[facility] DME Belongings:867004082}    RN SIGNATURE:  {Esignature:676792293}    CASE MANAGEMENT/SOCIAL WORK SECTION    Inpatient Status Date: 2022    Readmission Risk Assessment Score:  Readmission Risk              Risk of Unplanned Readmission:  [id number]           Discharging to Facility/ Agency     The [address] [place] [address]  Phone: [phone]  Appointment: July 28th at 1:15pm with Dr. Toshia Porter    / signature: Electronically signed by JAGRUTI Echeverria on 7/21/2022 at 10:50 AM      PHYSICIAN SECTION    Prognosis: {Prognosis:7565348344}    Condition at Discharge: Diego8 Jasmyn Mcnair Patient Condition:362754985}    Rehab Potential (if transferring to Rehab): {Prognosis:0848344722}    Recommended Labs or Other Treatments After Discharge: ***    Physician Certification: I certify the above information and transfer of Jeanine Soler  is necessary for the continuing treatment of the diagnosis listed and that he requires {Admit to Appropriate Level of Care:13279} for {GREATER/LESS:150884680} 30 days.      Update Admission H&P: {CHP DME Changes in DTOGY:063887156}    PHYSICIAN SIGNATURE:  {Esignature:426885090}

## 2022-07-21 NOTE — DISCHARGE SUMMARY
without IV contrast, liver protocol for further characterization      No obvious gallstones noted on CT      Punctate pulmonary nodule left lower lobe. Please see follow-up   recommendations below      Guidelines for follow-up and management of pulmonary nodules found on abdomen   CT:      <6 mm - No follow up recommend on the basis of the estimated low risk of   malignancy. Radiology 2017 http://pubs. rsna.org/doi/full/10.1148/radiol. 1002699178           Invasive procedures and treatments. None     [facility] Course. Huma Puri is a 37 y.o. male with history of acute pancreatitis 3 years ago, alcohol abuse, NIDDM presented with c/o severe, constant epigastric back pain. Onset 9 PM the day prior to presentation, radiating to the back, no aggravating or relieving factors, associated with nausea and one episode of nonbilious, nonbloody vomiting. Last drink was 4-5 days ago, had 8-10 beers. He denied any fevers, chills, diarrhea, chest pain or shortness of breath. In the ED CT done showed acute pancreatitis. Lipase of 112. He was started on IV fluids and pain control and admitted for further management. Acute pancreatitis due to alcohol abuse and hypertriglyceridemia:  CT abdomen and pelvis: Ill-defined peripancreatic fluid and edema compatible with acute pancreatitis. RUQ US: No cholecystitis or c cholelithiasis. Resolved with pain control, bowel rest and IV fluids. Tolerating full liquid diet. Advised to advance diet slowly over the next few days. GI consulted: Recommended EUS in 6 weeks to follow-up on pancreatitis pancreatic ductal dilatation and liver lesion noted on CT. Hypertriglyceridemia:  Triglyceride level down to 656 from 1060. Started on gemfibrozil with outpatient follow-up. Alcohol abuse:  Cessation counseling provided. Monitored on CIWA protocol with no withdrawal noted. Out patient resources provided.        T2 DM not on long-term insulin with hyperglycemia:  A1c 10.6% on 7/19/2022. Discharged on metformin and Lantus. Hepatic Nodule: Incidental finding on CT. MRI liver protocol recommended. Patient follow-up with GI. Pulmonary Nodule:  < 6 mm in the left lower lobe. No follow-up recommended. Consults. IP CONSULT TO GI  IP CONSULT TO SOCIAL WORK  IP CONSULT TO FINANCIAL COUNSELOR  IP CONSULT TO DIABETES EDUCATOR    Physical examination on discharge day. /73   Pulse 63   Temp 97.9 °F (36.6 °C) (Temporal)   Resp 10   Ht 5' 0.63\" (1.54 m)   Wt 136 lb 14.5 oz (62.1 kg)   SpO2 97%   BMI 26.18 kg/m²   General appearance. Alert. Looks comfortable. HEENT. Sclera clear. Moist mucus membranes. Cardiovascular. Regular rate and rhythm, normal S1, S2. No murmur. Respiratory. Not using accessory muscles. Clear to auscultation bilaterally, no wheeze. Gastrointestinal. Abdomen soft, non-tender, not distended, normal bowel sounds  Neurology. Facial symmetry. No speech deficits. Moving all extremities equally. Extremities. No edema in lower extremities. Skin. Warm, dry, normal turgor      Condition at time of discharge: Stable. Medication instructions provided to patient at discharge. Medication List        START taking these medications      Alcohol Swabs Pads  1 applicator by Does not apply route 2 times daily     Gavinaglharini QuijanoPen 100 UNIT/ML injection pen  Generic drug: insulin glargine  Inject 20 Units into the skin nightly     blood glucose test strips  Test 2 times a day & as needed for symptoms of irregular blood glucose. Dispense sufficient amount for indicated testing frequency plus additional to accommodate PRN testing needs. gemfibrozil 600 MG tablet  Commonly known as: LOPID  Take 1 tablet by mouth in the morning and 1 tablet in the evening. Take before meals.      glucose monitoring kit  1 kit by Does not apply route daily     Insulin Pen Needle 31G X 8 MM Misc  1 each by Does not apply route daily     Lancets Misc  1 each by Does not apply route 2 times daily     metFORMIN 500 MG tablet  Commonly known as: GLUCOPHAGE  Take 1 tablet by mouth in the morning and 1 tablet in the evening. Take with meals. STOP taking these medications      ibuprofen 800 MG tablet  Commonly known as: IBU               Where to Get Your Medications        These medications were sent to [facility], [address] [place] [address], [address] [place] [address] Road      Phone: [phone]   Alcohol Swabs Pads  Basaglar KwikPen 100 UNIT/ML injection pen  blood glucose test strips  gemfibrozil 600 MG tablet  glucose monitoring kit  Insulin Pen Needle 31G X 8 MM Misc  Lancets Misc  metFORMIN 500 MG tablet         Discharge recommendations given to patient. Follow Up. With PCP in 1 week   Disposition. home  Activity. activity as tolerated  Diet: ADULT DIET; Full Liquid      Spent 35 minutes in discharge process.     Signed:  Mega Car MD     7/21/2022 11:03 AM

## 2022-07-21 NOTE — PROGRESS NOTES
Reassessment documented. No changes noted in care. Pt requesting pain meds. Toradol given. Denies other needs or concerns at present. Will continue to monitor.

## 2022-07-21 NOTE — PLAN OF CARE
Problem: ABCDS Injury Assessment  Goal: Absence of physical injury  Outcome: Progressing     Problem: Pain  Goal: Verbalizes/displays adequate comfort level or baseline comfort level  Outcome: Progressing     Problem: Safety - Adult  Goal: Free from fall injury  Outcome: Progressing     Problem: Discharge Planning  Goal: Discharge to home or other facility with appropriate resources  Outcome: Progressing

## 2022-07-21 NOTE — PLAN OF CARE
Problem: ABCDS Injury Assessment  Goal: Absence of physical injury  7/21/2022 0728 by June Vargas RN  Outcome: Progressing  7/21/2022 0619 by Joy Salazar RN  Outcome: Progressing     Problem: Pain  Goal: Verbalizes/displays adequate comfort level or baseline comfort level  7/21/2022 0728 by June Vargas RN  Outcome: Progressing  7/21/2022 0619 by Joy Salazar RN  Outcome: Progressing     Problem: Safety - Adult  Goal: Free from fall injury  7/21/2022 0728 by June Vargas RN  Outcome: Progressing  7/21/2022 0619 by Joy Salazar RN  Outcome: Progressing     Problem: Discharge Planning  Goal: Discharge to home or other facility with appropriate resources  7/21/2022 0728 by June Vargas RN  Outcome: Progressing  7/21/2022 0619 by Joy Salazar RN  Outcome: Progressing

## 2022-07-21 NOTE — PROGRESS NOTES
Gastroenterology Progress Note    Eamon Shoemaker is a 37 y.o. male patient. 1. Idiopathic acute pancreatitis without infection or necrosis    2. Diabetes mellitus, new onset [facility])        Admission Date: 2022  Hospital Day: Hospital Day: 3  Attending: Tracy Li MD  Date of service: 22    SUBJECTIVE:    Doing well. Tolerating clears  Denies abdominal pain. He wants to go home today     ROS:  Cardiovascular ROS: no chest pain or dyspnea on exertion  Gastrointestinal ROS: see above  Respiratory ROS: no cough, shortness of breath, or wheezing    Physical    VITALS:  /73   Pulse 63   Temp 97.9 °F (36.6 °C) (Temporal)   Resp 10   Ht 5' 0.63\" (1.54 m)   Wt 136 lb 14.5 oz (62.1 kg)   SpO2 97%   BMI 26.18 kg/m²   TEMPERATURE:  Current - Temp: 97.9 °F (36.6 °C); Max - Temp  Av.7 °F (36.5 °C)  Min: 97.2 °F (36.2 °C)  Max: 98.1 °F (36.7 °C)    NAD  RRR, Nl s1s2  Lungs CTA Bilaterally, normal effort  Abdomen soft, ND, NT, no HSM, Bowel sounds normal  AAOx3, No asterixis     Data      CBC:   Recent Labs     22  1502 22  0508 22  0423   WBC 10.4 6.2 5.2   RBC 4.06* 3.67* 3.51*   HGB 12.9* 11.7* 11.0*   HCT 37.4* 34.1* 32.4*    239 205   MCV 92.1 92.9 92.3   MCH 31.8 32.0 31.4   MCHC 34.5 34.5 34.0   RDW 12.7 12.8 12.6        BMP:  Recent Labs     22  1502 22  0508 22  0423    134* 138   K 4.3 3.6 3.7   CL 98* 97* 102   CO2 30 30 35*   BUN 16 9 3*   CREATININE 0.7* <0.5* <0.5*   CALCIUM 8.8 8.5 8.4   GLUCOSE 155* 132* 153*        Hepatic Function Panel:   Recent Labs     22  1502 22  0508 22  0423   AST 35 28 22   ALT 23 18 14   BILITOT 0.7 2.0* 1.1*   ALKPHOS 113 105 94       Recent Labs     22  0506   LIPASE 112.0*     No results for input(s): PROTIME, INR in the last 72 hours. No results for input(s): PTT in the last 72 hours. No results for input(s): OCCULTBLD in the last 72 hours.       Radiology Review:    US ABDOMEN LIMITED Specify organ? LIVER, GALLBLADDER   Final Result   No cholelithiasis or cholecystitis      Fatty liver. Nodular density seen in the liver on recent CT scan is not clearly seen by   ultrasound      RECOMMENDATIONS:   Unavailable         CT ABDOMEN PELVIS W IV CONTRAST Additional Contrast? None   Final Result   Ill-defined peripancreatic fluid and edema, compatible with acute   pancreatitis. No drainable pseudocyst noted      Fatty liver. Hyperdense nodule is seen peripherally in the right hepatic   lobe. Most commonly this represents focal fatty sparing or hemangioma in a   patient of this age. Consider follow-up non urgent abdominal MRI, with and   without IV contrast, liver protocol for further characterization      No obvious gallstones noted on CT      Punctate pulmonary nodule left lower lobe. Please see follow-up   recommendations below      Guidelines for follow-up and management of pulmonary nodules found on abdomen   CT:      <6 mm - No follow up recommend on the basis of the estimated low risk of   malignancy. Radiology 2017 http://pubs. rsna.org/doi/full/10.1148/radiol. [id number]           ASSESSMENT   Acute pancreatitis -lipase 112 with CT evidence of pancreatitis. CT also with mild pancreatic ductal dilation and calcifications in the pancreas which can be seen with chronic pancreatitis. History of acute pancreatitis in the past felt to be from alcohol. Cut back and currently drinking a sixpack of beer on the weekend. No gallstones or biliary dilation on ultrasound. No meds at home. Normal calcium. TG was 1,038. He was moved to ICU for insulin drip but was not started and TG now 656. Abdominal pain has resolved and tolerating clear liquids. Abnormal liver on CT -CT with fatty liver and hyperdense nodule which could be fatty sparing or hemangioma but MRI recommended to further characterize this. PLAN    -Advance diet to full liquids.  If tolerating full liquids, advance diet over a few days   -Gemfibrozil 600 mg BID   -Discussed with patient the importance of complete alcohol cessation  -plan EUS in 6 weeks to f/u pancreatitis, pancreatic ductal dilation and liver lesion noted on CT  -If tolerating diet, may discharge today    Micaela Briscoe MD, MSc  [facility]  07/21/22

## 2022-07-21 NOTE — PROGRESS NOTES
No changes noted in assessment. Assisted pt to bathroom. Urinal at bedside. No further needs expressed. Will continue to monitor.

## 2022-07-21 NOTE — PROGRESS NOTES
CLINICAL PHARMACY NOTE: MEDS TO BEDS    Total # of Prescriptions Filled: 8   The following medications were delivered to the patient:  Alcohol swabs  Metformin 500 mg  Lancets  Test strips  Meter  Pen needles  Gemfibrozil 600 mg  Basaglar    Additional Documentation:  Delivered to Patient=Signed  Ok to be delivered per Kristie Maurice CP

## 2022-07-21 NOTE — DISCHARGE INSTRUCTIONS
Please follow-up with PCP as scheduled at the [facility] clinic. Slowly advance diet over the next few days as tolerated. Call to make an appointment with RICO Griggs in 1 month. Follow-up with PCP for monitoring of diabetes and high triglycerides level. Take insulin and metformin as prescribed. Continue monitoring your blood glucose as instructed. Activity as tolerated.

## 2022-07-21 NOTE — PROGRESS NOTES
PM assessment complete and documented. Meds given per MAR. Family in room. Morphine given for pain. No other needs or concerns expressed. Will continue to monitor.

## 2022-07-21 NOTE — PROGRESS NOTES
[facility] Diabetes Education Nurse  Consult Note     Followed up for DM education. Video  Buzz Ordaz [id number] interpreted. Opened home glucometer, set date and time. Instructed pt on use. Pt v/u. Reviewed discharge medications at bedside. Pt requested return to work note be faxed to [employer] at [phone]. Primary RN states she will assist with this. Pt asked appropriate questions during education, denied other concerns at time of visit.       Stephane Ayala MSN, RN, [facility]  Certified Diabetes Care and

## 2022-07-21 NOTE — PLAN OF CARE
Problem: ABCDS Injury Assessment  Goal: Absence of physical injury  7/21/2022 1530 by Vikki Cordova RN  Outcome: Completed  7/21/2022 0728 by Vikki Cordova RN  Outcome: Progressing  7/21/2022 0619 by Angela Lloyd RN  Outcome: Progressing     Problem: Pain  Goal: Verbalizes/displays adequate comfort level or baseline comfort level  7/21/2022 1530 by Vikki Cordova RN  Outcome: Completed  7/21/2022 0728 by Vikki Cordova RN  Outcome: Progressing  7/21/2022 0619 by Angela Lloyd RN  Outcome: Progressing     Problem: Safety - Adult  Goal: Free from fall injury  7/21/2022 1530 by Vikki Cordova RN  Outcome: Completed  7/21/2022 0728 by Vikki Cordova RN  Outcome: Progressing  7/21/2022 0619 by Angela Lloyd RN  Outcome: Progressing     Problem: Discharge Planning  Goal: Discharge to home or other facility with appropriate resources  7/21/2022 1530 by Vikki Cordova RN  Outcome: Completed  7/21/2022 0728 by Vikki Cordova RN  Outcome: Progressing  7/21/2022 0619 by Angela Lloyd RN  Outcome: Progressing

## 2022-07-22 LAB — TRIGL SERPL-MCNC: 337 MG/DL (ref 0–150)

## 2022-07-28 ENCOUNTER — OFFICE VISIT (OUTPATIENT)
Dept: INTERNAL MEDICINE CLINIC | Age: 42
End: 2022-07-28

## 2022-07-28 VITALS
OXYGEN SATURATION: 99 % | HEART RATE: 76 BPM | BODY MASS INDEX: 27.52 KG/M2 | DIASTOLIC BLOOD PRESSURE: 75 MMHG | TEMPERATURE: 97.2 F | WEIGHT: 143.9 LBS | SYSTOLIC BLOOD PRESSURE: 120 MMHG | RESPIRATION RATE: 16 BRPM

## 2022-07-28 DIAGNOSIS — E11.01 TYPE 2 DIABETES MELLITUS WITH HYPEROSMOLAR COMA, UNSPECIFIED WHETHER LONG TERM INSULIN USE (HCC): ICD-10-CM

## 2022-07-28 DIAGNOSIS — K85.01 IDIOPATHIC ACUTE PANCREATITIS WITH UNINFECTED NECROSIS: Primary | ICD-10-CM

## 2022-07-28 DIAGNOSIS — E78.1 HYPERTRIGLYCERIDEMIA: ICD-10-CM

## 2022-07-28 PROCEDURE — 99213 OFFICE O/P EST LOW 20 MIN: CPT | Performed by: PHYSICIAN ASSISTANT

## 2022-07-28 ASSESSMENT — PATIENT HEALTH QUESTIONNAIRE - PHQ9
1. LITTLE INTEREST OR PLEASURE IN DOING THINGS: 0
SUM OF ALL RESPONSES TO PHQ QUESTIONS 1-9: 0
SUM OF ALL RESPONSES TO PHQ QUESTIONS 1-9: 0
SUM OF ALL RESPONSES TO PHQ9 QUESTIONS 1 & 2: 0
2. FEELING DOWN, DEPRESSED OR HOPELESS: 0
SUM OF ALL RESPONSES TO PHQ QUESTIONS 1-9: 0
SUM OF ALL RESPONSES TO PHQ QUESTIONS 1-9: 0

## 2022-07-28 NOTE — PROGRESS NOTES
2022    Vandana Allen (:  1980) is a 37 y.o. male, here for a hospital follow-up. He was recently admitted to [facility] from - for acute pancreatitis where his triglyceride was near 1000. He was discharged on 2022 on gemfibrozil 600 mg twice daily and metformin 500 mg twice daily. He is currently taking 20 units of Lantus at bedtime. He reports his fasting blood sugars been 140s to 150s. He did report 1 episode of hypoglycemia where his blood glucose was 66 which responded appropriately to pop. He currently denies any chest pain, shortness of breath, fever, chills, nausea, vomiting, diarrhea, constipation. Patient Active Problem List   Diagnosis    Acute pancreatitis without infection or necrosis, unspecified pancreatitis type    Hypertriglyceridemia    Alcohol abuse    Magnesium deficiency    Type 2 diabetes mellitus with hyperglycemia, without long-term current use of insulin (HCC)    Nodule on liver    Pancreatic duct dilated    Pulmonary nodule       Review of Systems    Prior to Visit Medications    Medication Sig Taking? Authorizing Provider   metFORMIN (GLUCOPHAGE) 1000 MG tablet Take 1 tablet by mouth in the morning and 1 tablet in the evening. Take with meals. Yes Efren Mckeon MD   gemfibrozil (LOPID) 600 MG tablet Take 1 tablet by mouth in the morning and 1 tablet in the evening. Take before meals. Yes Audra Werner MD   insulin glargine (BASAGLAR KWIKPEN) 100 UNIT/ML injection pen Inject 20 Units into the skin nightly Yes Audra Werner MD   Insulin Pen Needle 31G X 8 MM MISC 1 each by Does not apply route daily  Audra Werner MD   Alcohol Swabs PADS 1 applicator by Does not apply route 2 times daily  Audra Werner MD   glucose monitoring (FREESTYLE FREEDOM) kit 1 kit by Does not apply route daily  Audra Werner MD   blood glucose monitor strips Test 2 times a day & as needed for symptoms of irregular blood glucose.  Dispense sufficient amount for pancreatitis  - likely related to recent alcohol abuse and hypertriglyceridemia  - educated on importance for alcohol cessation  - no abdominal pain at this time. Tolerating PO    DMII Uncontrolled  - recent HgbA1c 10.2 in 7/2022  - increased Metformin to 1000 mg BID  - Basaglar Kwikpen 20 U at bedtime  - Instructed to closely monitor BG and bring log to next appointment  - Instructed to call in 2 weeks if blood glucose remains high and we will increase his Basaglar    Hypertriglyceridemia  -  on 7/21/22  - continue Gemfibrozil 600 mg BID  - will check TG at next visit    Return in about 4 weeks (around 8/25/2022). A  was used during this encounter. Discharge plan was translated to patient. All of the patient's questions were answered in Vietnamese to his satisfaction. Patient was staffed with Dr. Adelfo Mack    --Nicole Meredith MD  PGY3 IM Resident     Addendum to Resident H& P/Progress note:  I have personally seen,examined and evaluated the patient.  I have reviewed the current history, physical findings, labs and assessment and plan and agree with note as documented by resident MD ( Elizabeth Beckford)      Hai Arredondo MD, Awilda Jeff

## 2022-07-29 ENCOUNTER — TELEPHONE (OUTPATIENT)
Dept: INTERNAL MEDICINE CLINIC | Age: 42
End: 2022-07-29

## 2022-07-29 NOTE — TELEPHONE ENCOUNTER
[facility] NEED CLARIFICATION ON QUANTITY FOR METFORMIN.  [facility] 30 [phone]

## 2022-08-25 ENCOUNTER — OFFICE VISIT (OUTPATIENT)
Dept: INTERNAL MEDICINE CLINIC | Age: 42
End: 2022-08-25

## 2022-08-25 VITALS
TEMPERATURE: 97.7 F | HEIGHT: 61 IN | HEART RATE: 76 BPM | BODY MASS INDEX: 25.62 KG/M2 | SYSTOLIC BLOOD PRESSURE: 111 MMHG | DIASTOLIC BLOOD PRESSURE: 71 MMHG | OXYGEN SATURATION: 99 % | RESPIRATION RATE: 16 BRPM | WEIGHT: 135.7 LBS

## 2022-08-25 DIAGNOSIS — E11.01 TYPE 2 DIABETES MELLITUS WITH HYPEROSMOLAR COMA, UNSPECIFIED WHETHER LONG TERM INSULIN USE (HCC): ICD-10-CM

## 2022-08-25 DIAGNOSIS — E78.1 HYPERTRIGLYCERIDEMIA: Primary | ICD-10-CM

## 2022-08-25 PROCEDURE — 99213 OFFICE O/P EST LOW 20 MIN: CPT | Performed by: STUDENT IN AN ORGANIZED HEALTH CARE EDUCATION/TRAINING PROGRAM

## 2022-08-25 RX ORDER — GEMFIBROZIL 600 MG/1
600 TABLET, FILM COATED ORAL
Qty: 60 TABLET | Refills: 3 | Status: SHIPPED | OUTPATIENT
Start: 2022-08-25

## 2022-08-25 ASSESSMENT — ENCOUNTER SYMPTOMS
SORE THROAT: 0
ABDOMINAL PAIN: 0
CONSTIPATION: 0
ABDOMINAL DISTENTION: 0
SHORTNESS OF BREATH: 0
DIARRHEA: 0
NAUSEA: 0
COUGH: 0

## 2022-08-25 NOTE — PROGRESS NOTES
Outpatient Clinic Established Patient Note    Patient: Malathi Li  : 1980 (37 y.o.)  Date: 2022    CC: Follow-up    HPI:      Hospitalized at [facility] in July for acute pancreatitis 2/2 hypergriglyceridemia. At hospital follow up on , metformin dose increased to 1g BID. He endorses blurry vision that has been going on for months, but he has not had this in 4 days. He checks his post prandial blood sugars at home. His blood sugar 1.5-2 hrs after eating was around 350 last week, but he reports that is has now been in 220s. He is not checking fasting AM glucose. Around 9 pm before bed glucose is around 140. Patient denies any GI side effects from metformin. Patient denies drinking alcohol, last drink 2 months ago. Patient eats normally, he is not limiting his carbohydrate intake. Home Meds:  Prior to Visit Medications    Medication Sig Taking? Authorizing Provider   metFORMIN (GLUCOPHAGE) 1000 MG tablet Take 1 tablet by mouth 2 times daily (with meals) Yes Maria Esther Greene MD   gemfibrozil (LOPID) 600 MG tablet Take 1 tablet by mouth 2 times daily (before meals) Yes Maria Esther Greene MD   insulin glargine (BASAGLAR KWIKPEN) 100 UNIT/ML injection pen Inject 20 Units into the skin nightly Yes Mega Car MD   Insulin Pen Needle 31G X 8 MM MISC 1 each by Does not apply route daily Yes Mega Car MD   Alcohol Swabs PADS 1 applicator by Does not apply route 2 times daily Yes Mega Car MD   glucose monitoring (FREESTYLE FREEDOM) kit 1 kit by Does not apply route daily Yes Mega Car MD   blood glucose monitor strips Test 2 times a day & as needed for symptoms of irregular blood glucose. Dispense sufficient amount for indicated testing frequency plus additional to accommodate PRN testing needs.  Yes Mega Car MD   Lancets MISC 1 each by Does not apply route 2 times daily Yes Mega Car MD   ibuprofen (IBU) 800 MG tablet Take 1 tablet by mouth every 6 hours as needed for Pain. MCKENNA Fried       Allergies:    Patient has no known allergies. Health Maintenance Due   Topic Date Due    COVID-19 Vaccine (1) Never done    Varicella vaccine (1 of 2 - 2-dose childhood series) Never done    Pneumococcal 0-64 years Vaccine (1 - PCV) Never done    Diabetic foot exam  Never done    HIV screen  Never done    Diabetic microalbuminuria test  Never done    Diabetic retinal exam  Never done    Hepatitis C screen  Never done    Hepatitis B vaccine (1 of 3 - Risk 3-dose series) Never done    DTaP/Tdap/Td vaccine (1 - Tdap) Never done         There is no immunization history on file for this patient. Review of Systems   Constitutional:  Negative for activity change, appetite change and fever. HENT:  Negative for congestion and sore throat. Respiratory:  Negative for cough and shortness of breath. Cardiovascular:  Negative for chest pain, palpitations and leg swelling. Gastrointestinal:  Negative for abdominal distention, abdominal pain, constipation, diarrhea and nausea. Genitourinary: Negative. Musculoskeletal: Negative. Neurological: Negative. Psychiatric/Behavioral: Negative. A 10-organ Review Of Systems was obtained and otherwise unremarkable except as per HPI. Data: Old records have been reviewed electronically. PHYSICAL EXAM:  /71 (Site: Right Upper Arm, Position: Sitting, Cuff Size: Medium Adult)   Pulse 76   Temp 97.7 °F (36.5 °C) (Temporal)   Resp 16   Ht 5' 0.5\" (1.537 m)   Wt 135 lb 11.2 oz (61.6 kg)   SpO2 99%   BMI 26.07 kg/m²   Physical Exam  Constitutional:       General: He is not in acute distress. Appearance: Normal appearance. HENT:      Head: Normocephalic. Mouth/Throat:      Mouth: Mucous membranes are moist.      Pharynx: Oropharynx is clear. Eyes:      Extraocular Movements: Extraocular movements intact.       Conjunctiva/sclera: Conjunctivae normal.   Cardiovascular:      Rate and Rhythm: Normal rate and regular rhythm. Pulses: Normal pulses. Heart sounds: Normal heart sounds. No murmur heard. Pulmonary:      Effort: Pulmonary effort is normal. No respiratory distress. Breath sounds: Normal breath sounds. No stridor. No wheezing or rales. Abdominal:      General: Abdomen is flat. There is no distension. Palpations: Abdomen is soft. Tenderness: There is no abdominal tenderness. Musculoskeletal:         General: No swelling or tenderness. Skin:     General: Skin is warm and dry. Neurological:      General: No focal deficit present. Mental Status: He is alert and oriented to person, place, and time. Sensory: No sensory deficit. Motor: No weakness. Gait: Gait normal.   Psychiatric:         Mood and Affect: Mood normal.         Behavior: Behavior normal.         Thought Content: Thought content normal.       Assessment & Plan:      1. Hypertriglyceridemia  TG down to 352 on 7/21/22 from 1060 on 7/19/22.  - LIPID PANEL; Future  - gemfibrozil (LOPID) 600 MG tablet; Take 1 tablet by mouth 2 times daily (before meals)  Dispense: 60 tablet; Refill: 3  - weight loss diet    2. Type 2 diabetes mellitus with hyperosmolar coma, unspecified whether long term insulin use (HCC)  HbA1c 10.6% on 7/19/22. Tolerating metformin well. - metFORMIN (GLUCOPHAGE) 1000 MG tablet; Take 1 tablet by mouth 2 times daily (with meals)  Dispense: 30 tablet;  Refill: 3  - information provided regarding low carb diet    Follow up in 2 months    Dispo: Pt has been staffed with Dr. Negar Lane  _______________  Isa Dietz MD, 8/25/2022 3:34 PM   PGY-3

## 2022-08-30 DIAGNOSIS — E78.1 HYPERTRIGLYCERIDEMIA: ICD-10-CM

## 2022-08-30 LAB
CHOLESTEROL, TOTAL: 138 MG/DL (ref 0–199)
HDLC SERPL-MCNC: 57 MG/DL (ref 40–60)
LDL CHOLESTEROL CALCULATED: 60 MG/DL
TRIGL SERPL-MCNC: 105 MG/DL (ref 0–150)
VLDLC SERPL CALC-MCNC: 21 MG/DL

## 2022-09-07 ENCOUNTER — TELEPHONE (OUTPATIENT)
Dept: INTERNAL MEDICINE CLINIC | Age: 42
End: 2022-09-07

## 2022-09-08 RX ORDER — ASCORBIC ACID 250 MG
250 TABLET ORAL DAILY
Qty: 30 TABLET | Refills: 1 | Status: SHIPPED | OUTPATIENT
Start: 2022-09-08

## 2022-10-22 ENCOUNTER — HOSPITAL ENCOUNTER (INPATIENT)
Age: 42
LOS: 1 days | Discharge: HOME | DRG: 440 | End: 2022-10-23
Attending: INTERNAL MEDICINE | Admitting: INTERNAL MEDICINE

## 2022-10-22 ENCOUNTER — APPOINTMENT (OUTPATIENT)
Dept: CT IMAGING | Age: 42
DRG: 440 | End: 2022-10-22

## 2022-10-22 ENCOUNTER — APPOINTMENT (OUTPATIENT)
Dept: GENERAL RADIOLOGY | Age: 42
DRG: 440 | End: 2022-10-22

## 2022-10-22 DIAGNOSIS — K85.90 ACUTE PANCREATITIS, UNSPECIFIED COMPLICATION STATUS, UNSPECIFIED PANCREATITIS TYPE: Primary | ICD-10-CM

## 2022-10-22 PROBLEM — K85.91 ACUTE PANCREATITIS WITH UNINFECTED NECROSIS: Status: ACTIVE | Noted: 2022-10-22

## 2022-10-22 LAB
A/G RATIO: 1.7 (ref 1.1–2.2)
ALBUMIN SERPL-MCNC: 4.4 G/DL (ref 3.4–5)
ALP BLD-CCNC: 114 U/L (ref 40–129)
ALT SERPL-CCNC: 19 U/L (ref 10–40)
ANION GAP SERPL CALCULATED.3IONS-SCNC: 13 MMOL/L (ref 3–16)
AST SERPL-CCNC: 27 U/L (ref 15–37)
BASE EXCESS VENOUS: 4.5 MMOL/L (ref -3–3)
BASOPHILS ABSOLUTE: 0 K/UL (ref 0–0.2)
BASOPHILS RELATIVE PERCENT: 0.9 %
BETA-HYDROXYBUTYRATE: 0.1 MMOL/L (ref 0–0.27)
BILIRUB SERPL-MCNC: 1 MG/DL (ref 0–1)
BILIRUBIN URINE: NEGATIVE
BLOOD, URINE: NEGATIVE
BUN BLDV-MCNC: 12 MG/DL (ref 7–20)
CALCIUM SERPL-MCNC: 9.2 MG/DL (ref 8.3–10.6)
CARBOXYHEMOGLOBIN: 2.5 % (ref 0–1.5)
CHLORIDE BLD-SCNC: 92 MMOL/L (ref 99–110)
CLARITY: CLEAR
CO2: 27 MMOL/L (ref 21–32)
COLOR: YELLOW
CREAT SERPL-MCNC: 0.6 MG/DL (ref 0.9–1.3)
EKG ATRIAL RATE: 75 BPM
EKG DIAGNOSIS: NORMAL
EKG P AXIS: 27 DEGREES
EKG P-R INTERVAL: 154 MS
EKG Q-T INTERVAL: 372 MS
EKG QRS DURATION: 88 MS
EKG QTC CALCULATION (BAZETT): 415 MS
EKG R AXIS: -29 DEGREES
EKG T AXIS: 14 DEGREES
EKG VENTRICULAR RATE: 75 BPM
EOSINOPHILS ABSOLUTE: 0 K/UL (ref 0–0.6)
EOSINOPHILS RELATIVE PERCENT: 0.5 %
GFR SERPL CREATININE-BSD FRML MDRD: >60 ML/MIN/{1.73_M2}
GLUCOSE BLD-MCNC: 133 MG/DL (ref 70–99)
GLUCOSE BLD-MCNC: 288 MG/DL (ref 70–99)
GLUCOSE URINE: 250 MG/DL
HCO3 VENOUS: 29.9 MMOL/L (ref 23–29)
HCT VFR BLD CALC: 38.1 % (ref 40.5–52.5)
HEMOGLOBIN: 13 G/DL (ref 13.5–17.5)
KETONES, URINE: NEGATIVE MG/DL
LEUKOCYTE ESTERASE, URINE: NEGATIVE
LIPASE: 20 U/L (ref 13–60)
LYMPHOCYTES ABSOLUTE: 1 K/UL (ref 1–5.1)
LYMPHOCYTES RELATIVE PERCENT: 23.5 %
MCH RBC QN AUTO: 31 PG (ref 26–34)
MCHC RBC AUTO-ENTMCNC: 34.1 G/DL (ref 31–36)
MCV RBC AUTO: 90.8 FL (ref 80–100)
METHEMOGLOBIN VENOUS: 0.4 %
MICROSCOPIC EXAMINATION: ABNORMAL
MONOCYTES ABSOLUTE: 0.3 K/UL (ref 0–1.3)
MONOCYTES RELATIVE PERCENT: 7.3 %
NEUTROPHILS ABSOLUTE: 3 K/UL (ref 1.7–7.7)
NEUTROPHILS RELATIVE PERCENT: 67.8 %
NITRITE, URINE: NEGATIVE
O2 CONTENT, VEN: 19 VOL %
O2 SAT, VEN: 99 %
O2 THERAPY: ABNORMAL
PCO2, VEN: 46 MMHG (ref 40–50)
PDW BLD-RTO: 13.2 % (ref 12.4–15.4)
PERFORMED ON: ABNORMAL
PH UA: 7.5 (ref 5–8)
PH VENOUS: 7.42 (ref 7.35–7.45)
PLATELET # BLD: 375 K/UL (ref 135–450)
PMV BLD AUTO: 7 FL (ref 5–10.5)
PO2, VEN: 143 MMHG (ref 25–40)
POTASSIUM REFLEX MAGNESIUM: 4.7 MMOL/L (ref 3.5–5.1)
PROTEIN UA: NEGATIVE MG/DL
RBC # BLD: 4.19 M/UL (ref 4.2–5.9)
SODIUM BLD-SCNC: 132 MMOL/L (ref 136–145)
SPECIFIC GRAVITY UA: >=1.03 (ref 1–1.03)
TCO2 CALC VENOUS: 70 MMOL/L
TOTAL PROTEIN: 7 G/DL (ref 6.4–8.2)
TROPONIN: <0.01 NG/ML
URINE REFLEX TO CULTURE: ABNORMAL
URINE TYPE: ABNORMAL
UROBILINOGEN, URINE: 1 E.U./DL
WBC # BLD: 4.5 K/UL (ref 4–11)

## 2022-10-22 PROCEDURE — 96374 THER/PROPH/DIAG INJ IV PUSH: CPT

## 2022-10-22 PROCEDURE — 94760 N-INVAS EAR/PLS OXIMETRY 1: CPT

## 2022-10-22 PROCEDURE — 80053 COMPREHEN METABOLIC PANEL: CPT

## 2022-10-22 PROCEDURE — 1200000000 HC SEMI PRIVATE

## 2022-10-22 PROCEDURE — 74177 CT ABD & PELVIS W/CONTRAST: CPT

## 2022-10-22 PROCEDURE — 6360000004 HC RX CONTRAST MEDICATION: Performed by: PHYSICIAN ASSISTANT

## 2022-10-22 PROCEDURE — 84484 ASSAY OF TROPONIN QUANT: CPT

## 2022-10-22 PROCEDURE — 96375 TX/PRO/DX INJ NEW DRUG ADDON: CPT

## 2022-10-22 PROCEDURE — 6360000002 HC RX W HCPCS: Performed by: PHYSICIAN ASSISTANT

## 2022-10-22 PROCEDURE — 82803 BLOOD GASES ANY COMBINATION: CPT

## 2022-10-22 PROCEDURE — 99285 EMERGENCY DEPT VISIT HI MDM: CPT

## 2022-10-22 PROCEDURE — 83036 HEMOGLOBIN GLYCOSYLATED A1C: CPT

## 2022-10-22 PROCEDURE — C9113 INJ PANTOPRAZOLE SODIUM, VIA: HCPCS | Performed by: PHYSICIAN ASSISTANT

## 2022-10-22 PROCEDURE — 96376 TX/PRO/DX INJ SAME DRUG ADON: CPT

## 2022-10-22 PROCEDURE — 85025 COMPLETE CBC W/AUTO DIFF WBC: CPT

## 2022-10-22 PROCEDURE — 93005 ELECTROCARDIOGRAM TRACING: CPT | Performed by: PHYSICIAN ASSISTANT

## 2022-10-22 PROCEDURE — 36415 COLL VENOUS BLD VENIPUNCTURE: CPT

## 2022-10-22 PROCEDURE — 2580000003 HC RX 258: Performed by: INTERNAL MEDICINE

## 2022-10-22 PROCEDURE — 6360000002 HC RX W HCPCS: Performed by: INTERNAL MEDICINE

## 2022-10-22 PROCEDURE — 83690 ASSAY OF LIPASE: CPT

## 2022-10-22 PROCEDURE — 2580000003 HC RX 258: Performed by: PHYSICIAN ASSISTANT

## 2022-10-22 PROCEDURE — 81003 URINALYSIS AUTO W/O SCOPE: CPT

## 2022-10-22 PROCEDURE — 71045 X-RAY EXAM CHEST 1 VIEW: CPT

## 2022-10-22 PROCEDURE — 82010 KETONE BODYS QUAN: CPT

## 2022-10-22 RX ORDER — INSULIN GLARGINE 100 [IU]/ML
20 INJECTION, SOLUTION SUBCUTANEOUS NIGHTLY
Status: DISCONTINUED | OUTPATIENT
Start: 2022-10-22 | End: 2022-10-23 | Stop reason: HOSPADM

## 2022-10-22 RX ORDER — MORPHINE SULFATE 4 MG/ML
4 INJECTION, SOLUTION INTRAMUSCULAR; INTRAVENOUS ONCE
Status: COMPLETED | OUTPATIENT
Start: 2022-10-22 | End: 2022-10-22

## 2022-10-22 RX ORDER — 0.9 % SODIUM CHLORIDE 0.9 %
1000 INTRAVENOUS SOLUTION INTRAVENOUS ONCE
Status: COMPLETED | OUTPATIENT
Start: 2022-10-22 | End: 2022-10-22

## 2022-10-22 RX ORDER — POLYETHYLENE GLYCOL 3350 17 G/17G
17 POWDER, FOR SOLUTION ORAL DAILY PRN
Status: DISCONTINUED | OUTPATIENT
Start: 2022-10-22 | End: 2022-10-23 | Stop reason: HOSPADM

## 2022-10-22 RX ORDER — ASCORBIC ACID 500 MG
250 TABLET ORAL DAILY
Status: DISCONTINUED | OUTPATIENT
Start: 2022-10-23 | End: 2022-10-23 | Stop reason: HOSPADM

## 2022-10-22 RX ORDER — INSULIN LISPRO 100 [IU]/ML
0-4 INJECTION, SOLUTION INTRAVENOUS; SUBCUTANEOUS NIGHTLY
Status: DISCONTINUED | OUTPATIENT
Start: 2022-10-22 | End: 2022-10-23 | Stop reason: HOSPADM

## 2022-10-22 RX ORDER — ACETAMINOPHEN 650 MG/1
650 SUPPOSITORY RECTAL EVERY 6 HOURS PRN
Status: DISCONTINUED | OUTPATIENT
Start: 2022-10-22 | End: 2022-10-23 | Stop reason: HOSPADM

## 2022-10-22 RX ORDER — ONDANSETRON 4 MG/1
4 TABLET, ORALLY DISINTEGRATING ORAL EVERY 8 HOURS PRN
Status: DISCONTINUED | OUTPATIENT
Start: 2022-10-22 | End: 2022-10-23 | Stop reason: HOSPADM

## 2022-10-22 RX ORDER — PANTOPRAZOLE SODIUM 40 MG/10ML
40 INJECTION, POWDER, LYOPHILIZED, FOR SOLUTION INTRAVENOUS ONCE
Status: COMPLETED | OUTPATIENT
Start: 2022-10-22 | End: 2022-10-22

## 2022-10-22 RX ORDER — ENOXAPARIN SODIUM 100 MG/ML
40 INJECTION SUBCUTANEOUS NIGHTLY
Status: DISCONTINUED | OUTPATIENT
Start: 2022-10-22 | End: 2022-10-23 | Stop reason: HOSPADM

## 2022-10-22 RX ORDER — KETOROLAC TROMETHAMINE 30 MG/ML
30 INJECTION, SOLUTION INTRAMUSCULAR; INTRAVENOUS EVERY 6 HOURS
Status: DISCONTINUED | OUTPATIENT
Start: 2022-10-22 | End: 2022-10-23 | Stop reason: HOSPADM

## 2022-10-22 RX ORDER — ONDANSETRON 2 MG/ML
4 INJECTION INTRAMUSCULAR; INTRAVENOUS ONCE
Status: COMPLETED | OUTPATIENT
Start: 2022-10-22 | End: 2022-10-22

## 2022-10-22 RX ORDER — ONDANSETRON 2 MG/ML
4 INJECTION INTRAMUSCULAR; INTRAVENOUS EVERY 6 HOURS PRN
Status: DISCONTINUED | OUTPATIENT
Start: 2022-10-22 | End: 2022-10-23 | Stop reason: HOSPADM

## 2022-10-22 RX ORDER — SODIUM CHLORIDE 9 MG/ML
INJECTION, SOLUTION INTRAVENOUS CONTINUOUS
Status: DISCONTINUED | OUTPATIENT
Start: 2022-10-22 | End: 2022-10-23 | Stop reason: HOSPADM

## 2022-10-22 RX ORDER — SODIUM CHLORIDE 9 MG/ML
INJECTION, SOLUTION INTRAVENOUS PRN
Status: DISCONTINUED | OUTPATIENT
Start: 2022-10-22 | End: 2022-10-23 | Stop reason: HOSPADM

## 2022-10-22 RX ORDER — ACETAMINOPHEN 325 MG/1
650 TABLET ORAL EVERY 6 HOURS PRN
Status: DISCONTINUED | OUTPATIENT
Start: 2022-10-22 | End: 2022-10-23 | Stop reason: HOSPADM

## 2022-10-22 RX ORDER — INSULIN LISPRO 100 [IU]/ML
0-8 INJECTION, SOLUTION INTRAVENOUS; SUBCUTANEOUS
Status: DISCONTINUED | OUTPATIENT
Start: 2022-10-22 | End: 2022-10-23 | Stop reason: HOSPADM

## 2022-10-22 RX ORDER — INSULIN LISPRO 100 [IU]/ML
5 INJECTION, SOLUTION INTRAVENOUS; SUBCUTANEOUS
Status: DISCONTINUED | OUTPATIENT
Start: 2022-10-22 | End: 2022-10-23 | Stop reason: HOSPADM

## 2022-10-22 RX ORDER — SODIUM CHLORIDE 0.9 % (FLUSH) 0.9 %
5-40 SYRINGE (ML) INJECTION EVERY 12 HOURS SCHEDULED
Status: DISCONTINUED | OUTPATIENT
Start: 2022-10-22 | End: 2022-10-23 | Stop reason: HOSPADM

## 2022-10-22 RX ORDER — MORPHINE SULFATE 4 MG/ML
4 INJECTION, SOLUTION INTRAMUSCULAR; INTRAVENOUS
Status: DISCONTINUED | OUTPATIENT
Start: 2022-10-22 | End: 2022-10-23 | Stop reason: HOSPADM

## 2022-10-22 RX ORDER — SODIUM CHLORIDE 0.9 % (FLUSH) 0.9 %
5-40 SYRINGE (ML) INJECTION PRN
Status: DISCONTINUED | OUTPATIENT
Start: 2022-10-22 | End: 2022-10-23 | Stop reason: HOSPADM

## 2022-10-22 RX ADMIN — MORPHINE SULFATE 4 MG: 4 INJECTION, SOLUTION INTRAMUSCULAR; INTRAVENOUS at 12:21

## 2022-10-22 RX ADMIN — MORPHINE SULFATE 4 MG: 4 INJECTION, SOLUTION INTRAMUSCULAR; INTRAVENOUS at 10:47

## 2022-10-22 RX ADMIN — SODIUM CHLORIDE 1000 ML: 9 INJECTION, SOLUTION INTRAVENOUS at 10:48

## 2022-10-22 RX ADMIN — SODIUM CHLORIDE: 9 INJECTION, SOLUTION INTRAVENOUS at 16:44

## 2022-10-22 RX ADMIN — ONDANSETRON 4 MG: 2 INJECTION INTRAMUSCULAR; INTRAVENOUS at 10:48

## 2022-10-22 RX ADMIN — SODIUM CHLORIDE: 9 INJECTION, SOLUTION INTRAVENOUS at 17:08

## 2022-10-22 RX ADMIN — SODIUM CHLORIDE: 9 INJECTION, SOLUTION INTRAVENOUS at 23:10

## 2022-10-22 RX ADMIN — KETOROLAC TROMETHAMINE 30 MG: 30 INJECTION, SOLUTION INTRAMUSCULAR at 23:09

## 2022-10-22 RX ADMIN — IOPAMIDOL 75 ML: 755 INJECTION, SOLUTION INTRAVENOUS at 11:12

## 2022-10-22 RX ADMIN — ONDANSETRON 4 MG: 2 INJECTION INTRAMUSCULAR; INTRAVENOUS at 12:21

## 2022-10-22 RX ADMIN — PANTOPRAZOLE SODIUM 40 MG: 40 INJECTION, POWDER, FOR SOLUTION INTRAVENOUS at 10:47

## 2022-10-22 RX ADMIN — ENOXAPARIN SODIUM 40 MG: 100 INJECTION SUBCUTANEOUS at 23:09

## 2022-10-22 ASSESSMENT — PAIN DESCRIPTION - ONSET
ONSET: ON-GOING
ONSET: ON-GOING

## 2022-10-22 ASSESSMENT — PAIN - FUNCTIONAL ASSESSMENT
PAIN_FUNCTIONAL_ASSESSMENT: ACTIVITIES ARE NOT PREVENTED
PAIN_FUNCTIONAL_ASSESSMENT: ACTIVITIES ARE NOT PREVENTED

## 2022-10-22 ASSESSMENT — PAIN DESCRIPTION - LOCATION
LOCATION: ABDOMEN

## 2022-10-22 ASSESSMENT — ENCOUNTER SYMPTOMS
NAUSEA: 1
DIARRHEA: 0
ABDOMINAL DISTENTION: 0
RESPIRATORY NEGATIVE: 1
SHORTNESS OF BREATH: 0
RECTAL PAIN: 0
COLOR CHANGE: 0
BACK PAIN: 0
CONSTIPATION: 0
VOMITING: 1
CHEST TIGHTNESS: 0
COUGH: 0
ABDOMINAL PAIN: 1
BLOOD IN STOOL: 0
ANAL BLEEDING: 0

## 2022-10-22 ASSESSMENT — LIFESTYLE VARIABLES
HOW MANY STANDARD DRINKS CONTAINING ALCOHOL DO YOU HAVE ON A TYPICAL DAY: PATIENT DOES NOT DRINK
HOW OFTEN DO YOU HAVE A DRINK CONTAINING ALCOHOL: NEVER

## 2022-10-22 ASSESSMENT — PAIN DESCRIPTION - FREQUENCY
FREQUENCY: CONTINUOUS
FREQUENCY: CONTINUOUS

## 2022-10-22 ASSESSMENT — PAIN SCALES - GENERAL
PAINLEVEL_OUTOF10: 6
PAINLEVEL_OUTOF10: 7
PAINLEVEL_OUTOF10: 7

## 2022-10-22 ASSESSMENT — PAIN DESCRIPTION - ORIENTATION
ORIENTATION: LEFT;UPPER
ORIENTATION: LEFT;UPPER
ORIENTATION: MID;UPPER;LEFT

## 2022-10-22 ASSESSMENT — PAIN DESCRIPTION - DESCRIPTORS
DESCRIPTORS: SHARP

## 2022-10-22 NOTE — H&P
HOSPITALISTS HISTORY AND PHYSICAL    10/22/2022 3:21 PM    Patient Information:  Trina Stauffer is a 37 y.o. male [id number]  PCP:  No primary care provider on file. (Tel: None )    Chief complaint:    Chief Complaint   Patient presents with    Abdominal Pain     Upper abdominal pain, feeling cold, concerned for either low or high blood sugar, also states blurry vision x2 days. BS has been 140-200     History of Present Illness:  Tavia Rooney is a 37 y.o. male who who has been having 2-day history of upper abdominal pain that is 8 out of 10 sharp in nature associated with nausea and vomiting. Patient denies any fever chills or diarrhea does have a history of pancreatitis with alcohol use has not drank. Has had elevated blood sugars and the 200s. Patient been compliant with medication patient has CT scan in the ED which showed subacute to chronic pancreatitis lipase was normal.  Patient is also complaining of blurry vision for the last 2 to 3 weeks that is by lateral        REVIEW OF SYSTEMS:   Constitutional: Negative for fever,chills or night sweats  ENT: Negative for rhinorrhea, epistaxis, hoarseness, sore throat. Respiratory: Negative for shortness of breath,wheezing  Cardiovascular: Negative for chest pain, palpitations   Gastrointestinalsee above  Genitourinary: Negative for polyuria, dysuria   Hematologic/Lymphatic: Negative for bleeding tendency, easy bruising  Musculoskeletal: Negative for myalgias and arthralgias  Neurologic: Negative for confusion,dysarthria. Skin: Negative for itching,rash  Psychiatric: Negative for depression,anxiety, agitation. Endocrine: Negative for polydipsia,polyuria,heat /cold intolerance. Past Medical History:   has a past medical history of Acute pancreatitis and Diabetes mellitus ([facility] [address]      Past Surgical History:   has a past surgical history that includes Appendectomy. Medications:  No current facility-administered medications on file prior to encounter. Current Outpatient Medications on File Prior to Encounter   Medication Sig Dispense Refill    ascorbic acid (V-R VITAMIN C) 250 MG tablet Take 1 tablet by mouth daily 30 tablet 1    metFORMIN (GLUCOPHAGE) 1000 MG tablet Take 1 tablet by mouth 2 times daily (with meals) 30 tablet 3    gemfibrozil (LOPID) 600 MG tablet Take 1 tablet by mouth 2 times daily (before meals) 60 tablet 3    insulin glargine (BASAGLAR KWIKPEN) 100 UNIT/ML injection pen Inject 20 Units into the skin nightly 5 pen 0    Insulin Pen Needle 31G X 8 MM MISC 1 each by Does not apply route daily 100 each 3    Alcohol Swabs PADS 1 applicator by Does not apply route 2 times daily 100 each 1    glucose monitoring (FREESTYLE FREEDOM) kit 1 kit by Does not apply route daily 1 kit 0    blood glucose monitor strips Test 2 times a day & as needed for symptoms of irregular blood glucose. Dispense sufficient amount for indicated testing frequency plus additional to accommodate PRN testing needs. 2 strip 0    Lancets MISC 1 each by Does not apply route 2 times daily 200 each 0    [DISCONTINUED] ibuprofen (IBU) 800 MG tablet Take 1 tablet by mouth every 6 hours as needed for Pain. 120 tablet 0       Allergies:  No Known Allergies     Social History:  Patient Lives at home   reports that he has never smoked. He has never used smokeless tobacco. He reports that he does not use drugs. Family History:  family history includes No Known Problems in his father and mother. ,     Physical Exam:  BP (!) 141/89   Pulse 84   Temp 98.7 °F (37.1 °C) (Oral)   Resp 17   SpO2 100%     General appearance:  Appears comfortable.  AAOx3  HEENT: atraumatic, Pupils equal, muscous membranes moist, no masses appreciated  Cardiovascular: Regular rate and rhythm no murmurs appreciated  Respiratory: CTAB no wheezing  Gastrointestinal: epiGastric tenderness palpation no guarding or rigidity  EXT: no edema  Neurology: no gross focal deficts  Psychiatry: Appropriate affect. Not agitated  Skin: Warm, dry, no rashes appreciated    Labs:  CBC:   Lab Results   Component Value Date/Time    WBC 4.5 10/22/2022 10:20 AM    RBC 4.19 10/22/2022 10:20 AM    HGB 13.0 10/22/2022 10:20 AM    HCT 38.1 10/22/2022 10:20 AM    MCV 90.8 10/22/2022 10:20 AM    MCH 31.0 10/22/2022 10:20 AM    MCHC 34.1 10/22/2022 10:20 AM    RDW 13.2 10/22/2022 10:20 AM     10/22/2022 10:20 AM    MPV 7.0 10/22/2022 10:20 AM     BMP:    Lab Results   Component Value Date/Time     10/22/2022 10:20 AM    K 4.7 10/22/2022 10:20 AM    CL 92 10/22/2022 10:20 AM    CO2 27 10/22/2022 10:20 AM    BUN 12 10/22/2022 10:20 AM    CREATININE 0.6 10/22/2022 10:20 AM    CALCIUM 9.2 10/22/2022 10:20 AM    GFRAA >60 07/21/2022 04:23 AM    LABGLOM >60 10/22/2022 10:20 AM    GLUCOSE 288 10/22/2022 10:20 AM     CT ABDOMEN PELVIS W IV CONTRAST Additional Contrast? None   Final Result   Chronic or subacute pancreatitis. The majority of pancreatic stranding has   resolved. There is minimal residual fluid along the left anterior pararenal   fascia. Hepatic steatosis. Less than 3 mm nodule in the left lower lobe, stable in the 4 month interval.   No follow-up is recommended in the absence of risk factors. Otherwise, 12   month follow-up CT chest is recommended. RECOMMENDATIONS:   Radiology 2017 http://pubs. rsna.org/doi/full/10.1148/radiol. 7451474210         XR CHEST PORTABLE   Final Result   Unremarkable portable chest radiograph. Recent imaging reviewed    Problem List  Principal Problem:    Acute pancreatitis with uninfected necrosis  Resolved Problems:    * No resolved hospital problems.  *        Assessment/Plan:   Acute on chronic panctreaitits ?  - ivf  - npo for now   - iv morphine  -gi consult    Dm2 with hyperglycemia: lantus lispro ssi    Blurry vision: outpatient optho fu    DVT prophylaxis lovenox  Code status full code        Admit as inpatient I anticipate hospitalization spanning more than two midnights for investigation and treatment of the above medically necessary diagnoses. Please note that some part of this chart was generated using Dragon dictation software. Although every effort was made to ensure the accuracy of this automated transcription, some errors in transcription may have occurred inadvertently. If you may need any clarification, please do not hesitate to contact me through [facility].        Ananias Lombard, MD    10/22/2022 3:21 PM

## 2022-10-22 NOTE — ED NOTES
RN at bedside to ask pt about blurry vision. Pt states \"my vision is fine, however my abdomen hurts. \" MCKENNA Thornton made aware at this time.             Marco Kebede RN  10/22/22 9475

## 2022-10-22 NOTE — PROGRESS NOTES
This RN attempted to contact ER nurse for report  but stated will call me back after staying on the line for 5 mins. 3:50 pm  Received report from ER nurse. 4:30 om  This RN transported pt in a wheelchair from ER to unit at this time. Patient is alert and oriented x4. Speaks Irish only. Admission and shift assessment completed; used video  # [id number]. Neuro WNL. Reports pain 6/10 to abd at this time. Patient remains NPO. The care plan and education has been reviewed and mutually agreed upon with the patient. Standard safety measures in place.

## 2022-10-22 NOTE — PLAN OF CARE
Problem: Discharge Planning  Goal: Discharge to home or other facility with appropriate resources  Outcome: Progressing     Problem: Pain  Goal: Verbalizes/displays adequate comfort level or baseline comfort level  Outcome: Progressing  Flowsheets (Taken 10/22/2022 1640)  Verbalizes/displays adequate comfort level or baseline comfort level:   Encourage patient to monitor pain and request assistance   Assess pain using appropriate pain scale   Administer analgesics based on type and severity of pain and evaluate response     Problem: ABCDS Injury Assessment  Goal: Absence of physical injury  Outcome: Progressing     Problem: Gastrointestinal - Adult  Goal: Minimal or absence of nausea and vomiting  Outcome: Progressing  Goal: Maintains or returns to baseline bowel function  Outcome: Progressing  Goal: Maintains adequate nutritional intake  Outcome: Progressing

## 2022-10-22 NOTE — ED PROVIDER NOTES
[address] [facility]        Pt Name: Kat Caceres  MRN: [id number]  [place] 1980  Date of evaluation: 10/22/2022  Provider: MCKENNA Segovia  PCP: No primary care provider on file. Note Started: 10:19 AM EDT       LLOYD. I have evaluated this patient. My supervising physician was available for consultation. CHIEF COMPLAINT       Chief Complaint   Patient presents with    Abdominal Pain     Upper abdominal pain, feeling cold, concerned for either low or high blood sugar, also states blurry vision x2 days. BS has been 140-200       HISTORY OF PRESENT ILLNESS   (Location, Timing/Onset, Context/Setting, Quality, Duration, Modifying Factors, Severity, Associated Signs and Symptoms)  Note limiting factors. Chief Complaint: Abd Caren Caceres is a 37 y.o. male with past medical history of diabetes and previous pancreatitis who presents to the ED with complaint of upper abdominal pain. Patient states he has had pain to his upper abdomen without any radiation that he has been ongoing for the past 1 to 2 days. Patient states he has had associated chills. States he had nausea and vomiting. Patient states he is concerned it is due to elevated blood sugar. He is a diabetic and states his blood sugar has been ranging anywhere from 140-200 over the past couple days. He states been take his medication as prescribed. He denies any urinary symptoms or change in bowel moods. Denies chest pain or shortness of breath. Denies fever or chills. Denies any rashes or lesions. He states he has surgery on his abdomen the past for appendicitis. Patient states has had pancreatitis in the past when he drank alcohol. He states he is no longer drinking. States current symptoms feel similar to when he had pancreatitis in the past.  He became concerned and came to the ED for further evaluation and treatment.   He states he is also been having some blurry vision. He states he has bilateral eye blurry vision which he states is been ongoing for the past 2 to 3 weeks. Apparently is been ongoing for quite some time and he was told it was due to his underlying diabetes. He denies any eye pain, eye redness or eye drainage. He denies any headache or double vision. He states the blurry vision he has had for quite some time and states he was told it was due to his underlying diabetes. Does not currently follow-up with an ophthalmologist.    Nursing Notes were all reviewed and agreed with or any disagreements were addressed in the HPI. REVIEW OF SYSTEMS    (2-9 systems for level 4, 10 or more for level 5)     Review of Systems   Constitutional:  Positive for appetite change and chills. Negative for activity change, diaphoresis, fatigue and fever. Respiratory: Negative. Negative for cough, chest tightness and shortness of breath. Cardiovascular: Negative. Negative for chest pain, palpitations and leg swelling. Gastrointestinal:  Positive for abdominal pain, nausea and vomiting. Negative for abdominal distention, anal bleeding, blood in stool, constipation, diarrhea and rectal pain. Genitourinary:  Negative for decreased urine volume, difficulty urinating, dysuria, flank pain, frequency, hematuria and urgency. Musculoskeletal:  Negative for arthralgias, back pain, myalgias, neck pain and neck stiffness. Skin:  Negative for color change, pallor, rash and wound. Neurological:  Negative for dizziness, weakness, light-headedness, numbness and headaches. Positives and Pertinent negatives as per HPI. Except as noted above in the ROS, all other systems were reviewed and negative.        PAST MEDICAL HISTORY     Past Medical History:   Diagnosis Date    Acute pancreatitis     Diabetes mellitus ([facility] [address]          SURGICAL HISTORY     Past Surgical History:   Procedure Laterality Date    APPENDECTOMY           CURRENTMEDICATIONS       Previous Medications    ALCOHOL SWABS PADS    1 applicator by Does not apply route 2 times daily    ASCORBIC ACID (V-R VITAMIN C) 250 MG TABLET    Take 1 tablet by mouth daily    BLOOD GLUCOSE MONITOR STRIPS    Test 2 times a day & as needed for symptoms of irregular blood glucose. Dispense sufficient amount for indicated testing frequency plus additional to accommodate PRN testing needs. GEMFIBROZIL (LOPID) 600 MG TABLET    Take 1 tablet by mouth 2 times daily (before meals)    GLUCOSE MONITORING (FREESTYLE FREEDOM) KIT    1 kit by Does not apply route daily    INSULIN GLARGINE (BASAGLAR KWIKPEN) 100 UNIT/ML INJECTION PEN    Inject 20 Units into the skin nightly    INSULIN PEN NEEDLE 31G X 8 MM MISC    1 each by Does not apply route daily    LANCETS MISC    1 each by Does not apply route 2 times daily    METFORMIN (GLUCOPHAGE) 1000 MG TABLET    Take 1 tablet by mouth 2 times daily (with meals)         ALLERGIES     Patient has no known allergies. FAMILYHISTORY       Family History   Problem Relation Age of Onset    No Known Problems Mother     No Known Problems Father           SOCIAL HISTORY       Social History     Tobacco Use    Smoking status: Never    Smokeless tobacco: Never   Substance Use Topics    Drug use: No       SCREENINGS    [place] Coma Scale  Eye Opening: Spontaneous  Best Verbal Response: Oriented  Best Motor Response: Obeys commands  [place] Coma Scale Score: 15        PHYSICAL EXAM    (up to 7 for level 4, 8 or more for level 5)     ED Triage Vitals [10/22/22 0948]   BP Temp Temp Source Heart Rate Resp SpO2 Height Weight   (!) 143/87 98.7 °F (37.1 °C) Oral 86 18 98 % -- --       Physical Exam  Constitutional:       General: He is not in acute distress. Appearance: He is well-developed. He is not ill-appearing, toxic-appearing or diaphoretic. HENT:      Head: Normocephalic and atraumatic.       Right Ear: External ear normal.      Left Ear: External ear normal.      Mouth/Throat:      Mouth: Mucous membranes are moist.      Pharynx: No oropharyngeal exudate or posterior oropharyngeal erythema. Eyes:      General:         Right eye: No discharge. Left eye: No discharge. Extraocular Movements: Extraocular movements intact. Conjunctiva/sclera: Conjunctivae normal.      Pupils: Pupils are equal, round, and reactive to light. Comments: No conjunctival injection bilaterally. No subungual hemorrhage bilaterally. No drainage from the eyes. Lids unremarkable without hordeolum or chalazion. No periorbital edema, ecchymosis, erythema or warmth. No temporal artery tenderness bilaterally. EOMs intact. PERRLA. No nystagmus. No proptosis. Negative test of skew. Visual fields intact. No tender outpatient over the globes. Funduscopic exam unremarkable. VA intact at bedside. Cardiovascular:      Rate and Rhythm: Normal rate and regular rhythm. Pulses: Normal pulses. Heart sounds: Normal heart sounds. No murmur heard. No friction rub. No gallop. Comments: Plus radial pulses bilaterally. No pedal edema. No calf tenderness. No JVD. Pulmonary:      Effort: Pulmonary effort is normal. No respiratory distress. Breath sounds: Normal breath sounds. No stridor. No wheezing, rhonchi or rales. Chest:      Chest wall: No tenderness. Abdominal:      General: Abdomen is flat. Bowel sounds are normal. There is no distension. Palpations: Abdomen is soft. There is no mass. Tenderness: There is abdominal tenderness in the right upper quadrant, epigastric area, periumbilical area and left upper quadrant. There is no right CVA tenderness, left CVA tenderness, guarding or rebound. Negative signs include Sanchez's sign, Rovsing's sign and McBurney's sign. Hernia: No hernia is present. Musculoskeletal:         General: Normal range of motion. Cervical back: Normal range of motion and neck supple. No rigidity or tenderness.    Lymphadenopathy: Cervical: No cervical adenopathy. Skin:     General: Skin is warm and dry. Coloration: Skin is not pale. Findings: No erythema. Neurological:      Mental Status: He is alert and oriented to person, place, and time. Psychiatric:         Behavior: Behavior normal.       DIAGNOSTIC RESULTS   LABS:    Labs Reviewed   CBC WITH AUTO DIFFERENTIAL - Abnormal; Notable for the following components:       Result Value    RBC 4.19 (*)     Hemoglobin 13.0 (*)     Hematocrit 38.1 (*)     All other components within normal limits   COMPREHENSIVE METABOLIC PANEL W/ REFLEX TO MG FOR LOW K - Abnormal; Notable for the following components:    Sodium 132 (*)     Chloride 92 (*)     Glucose 288 (*)     Creatinine 0.6 (*)     All other components within normal limits   BLOOD GAS, VENOUS - Abnormal; Notable for the following components:    pO2, Brennan 143.0 (*)     HCO3, Venous 29.9 (*)     Base Excess, Brennan 4.5 (*)     Carboxyhemoglobin 2.5 (*)     All other components within normal limits   URINALYSIS WITH REFLEX TO CULTURE - Abnormal; Notable for the following components:    Glucose, Ur 250 (*)     All other components within normal limits   TROPONIN   LIPASE   BETA-HYDROXYBUTYRATE       When ordered only abnormal lab results are displayed. All other labs were within normal range or not returned as of this dictation. EKG: When ordered, EKG's are interpreted by the Emergency Department Physician in the absence of a cardiologist.  Please see their note for interpretation of EKG. RADIOLOGY:   Non-plain film images such as CT, Ultrasound and MRI are read by the radiologist. Plain radiographic images are visualized and preliminarily interpreted by the ED Provider with the below findings:        Interpretation per the Radiologist below, if available at the time of this note:    CT ABDOMEN PELVIS W IV CONTRAST Additional Contrast? None   Final Result   Chronic or subacute pancreatitis.   The majority of pancreatic stranding has   resolved. There is minimal residual fluid along the left anterior pararenal   fascia. Hepatic steatosis. Less than 3 mm nodule in the left lower lobe, stable in the 4 month interval.   No follow-up is recommended in the absence of risk factors. Otherwise, 12   month follow-up CT chest is recommended. RECOMMENDATIONS:   Radiology 2017 http://pubs. rsna.org/doi/full/10.1148/radiol. 9291367659         XR CHEST PORTABLE   Final Result   Unremarkable portable chest radiograph. No results found. PROCEDURES   Unless otherwise noted below, none     Procedures    CRITICAL CARE TIME       CONSULTS:  None      EMERGENCY DEPARTMENT COURSE and DIFFERENTIAL DIAGNOSIS/MDM:   Vitals:    Vitals:    10/22/22 1053 10/22/22 1058 10/22/22 1158 10/22/22 1213   BP: (!) 128/91 136/87 (!) 135/91 131/87   Pulse: 85 82 78 82   Resp: 11 17 15 22   Temp:       TempSrc:       SpO2: 99% 99% 100% 100%       Patient was given the following medications:  Medications   0.9 % sodium chloride bolus (0 mLs IntraVENous Stopped 10/22/22 1206)   ondansetron (ZOFRAN) injection 4 mg (4 mg IntraVENous Given 10/22/22 1048)   pantoprazole (PROTONIX) injection 40 mg (40 mg IntraVENous Given 10/22/22 1047)   morphine injection 4 mg (4 mg IntraVENous Given 10/22/22 1047)   iopamidol (ISOVUE-370) 76 % injection 75 mL (75 mLs IntraVENous Given 10/22/22 1112)   morphine injection 4 mg (4 mg IntraVENous Given 10/22/22 1221)   ondansetron (ZOFRAN) injection 4 mg (4 mg IntraVENous Given 10/22/22 1221)         Is this patient to be included in the SEP-1 Core Measure due to severe sepsis or septic shock? No   Exclusion criteria - the patient is NOT to be included for SEP-1 Core Measure due to:  2+ SIRS criteria are not met    Patient is a 45-year-old male who presents the ED with complaint of upper abdominal pain with associated nausea and vomiting.   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  He is no longer drinking alcohol. Comes in with of couple day history of upper abdominal pain with associated nausea, vomiting and decreased oral intake. He is afebrile stable vital signs. Nontoxic appearance. Urinalysis showed 250 glucose but no signs of ketones. No signs of infection. Troponin normal.  EKG inter by attending. CBC showed normal white count and platelets. Hemoglobin 13. CMP relatively unremarkable. Lipase was normal.  Beta hydroxybutyrate normal.  VBG showed normal pH. Bicarb 29.9. No evidence of DKA. CT of the abdomen pelvis showed chronic or subacute pancreatitis with majority of pancreatic stranding resolved but there does appear to be some minimal residual fluid along the left anterior pararenal fascia. Patient required couple doses of pain medication here in the emergency department. He still complaining of pain to the abdomen. Believe he had a poor candidate for outpatient follow-up/management. Suffering from what appears to be subacute pancreatitis at this time. Case discussed with hospital service for admission. He remained NPO. FINAL IMPRESSION      1. Acute pancreatitis, unspecified complication status, unspecified pancreatitis type          DISPOSITION/PLAN   DISPOSITION Decision To Admit 10/22/2022 01:05:56 PM      PATIENT REFERRED TO:  No follow-up provider specified.     DISCHARGE MEDICATIONS:  New Prescriptions    No medications on file       DISCONTINUED MEDICATIONS:  Discontinued Medications    No medications on file              (Please note that portions of this note were completed with a voice recognition program.  Efforts were made to edit the dictations but occasionally words are mis-transcribed.)    MCKENNA Cormier (electronically signed)           MCKENNA Gallo  10/22/22 5706

## 2022-10-23 VITALS
BODY MASS INDEX: 24.99 KG/M2 | TEMPERATURE: 97.9 F | DIASTOLIC BLOOD PRESSURE: 85 MMHG | RESPIRATION RATE: 18 BRPM | HEIGHT: 61 IN | HEART RATE: 74 BPM | WEIGHT: 132.38 LBS | SYSTOLIC BLOOD PRESSURE: 130 MMHG | OXYGEN SATURATION: 91 %

## 2022-10-23 LAB
A/G RATIO: 1.4 (ref 1.1–2.2)
ALBUMIN SERPL-MCNC: 3.7 G/DL (ref 3.4–5)
ALP BLD-CCNC: 123 U/L (ref 40–129)
ALT SERPL-CCNC: 15 U/L (ref 10–40)
ANION GAP SERPL CALCULATED.3IONS-SCNC: 10 MMOL/L (ref 3–16)
AST SERPL-CCNC: 20 U/L (ref 15–37)
BASOPHILS ABSOLUTE: 0 K/UL (ref 0–0.2)
BASOPHILS RELATIVE PERCENT: 0.4 %
BILIRUB SERPL-MCNC: 1.6 MG/DL (ref 0–1)
BUN BLDV-MCNC: 11 MG/DL (ref 7–20)
CALCIUM SERPL-MCNC: 8.5 MG/DL (ref 8.3–10.6)
CHLORIDE BLD-SCNC: 101 MMOL/L (ref 99–110)
CO2: 24 MMOL/L (ref 21–32)
CREAT SERPL-MCNC: 0.7 MG/DL (ref 0.9–1.3)
EOSINOPHILS ABSOLUTE: 0 K/UL (ref 0–0.6)
EOSINOPHILS RELATIVE PERCENT: 0.7 %
ESTIMATED AVERAGE GLUCOSE: 203 MG/DL
GFR SERPL CREATININE-BSD FRML MDRD: >60 ML/MIN/{1.73_M2}
GLUCOSE BLD-MCNC: 142 MG/DL (ref 70–99)
GLUCOSE BLD-MCNC: 148 MG/DL (ref 70–99)
GLUCOSE BLD-MCNC: 159 MG/DL (ref 70–99)
GLUCOSE BLD-MCNC: 168 MG/DL (ref 70–99)
HBA1C MFR BLD: 8.7 %
HCT VFR BLD CALC: 33.5 % (ref 40.5–52.5)
HEMOGLOBIN: 11.6 G/DL (ref 13.5–17.5)
LIPASE: 39 U/L (ref 13–60)
LYMPHOCYTES ABSOLUTE: 1.7 K/UL (ref 1–5.1)
LYMPHOCYTES RELATIVE PERCENT: 24.7 %
MCH RBC QN AUTO: 31.3 PG (ref 26–34)
MCHC RBC AUTO-ENTMCNC: 34.7 G/DL (ref 31–36)
MCV RBC AUTO: 90.2 FL (ref 80–100)
MONOCYTES ABSOLUTE: 0.5 K/UL (ref 0–1.3)
MONOCYTES RELATIVE PERCENT: 7.3 %
NEUTROPHILS ABSOLUTE: 4.7 K/UL (ref 1.7–7.7)
NEUTROPHILS RELATIVE PERCENT: 66.9 %
PDW BLD-RTO: 13.2 % (ref 12.4–15.4)
PERFORMED ON: ABNORMAL
PLATELET # BLD: 292 K/UL (ref 135–450)
PMV BLD AUTO: 6.9 FL (ref 5–10.5)
POTASSIUM REFLEX MAGNESIUM: 4.1 MMOL/L (ref 3.5–5.1)
RBC # BLD: 3.71 M/UL (ref 4.2–5.9)
SODIUM BLD-SCNC: 135 MMOL/L (ref 136–145)
TOTAL PROTEIN: 6.4 G/DL (ref 6.4–8.2)
WBC # BLD: 7 K/UL (ref 4–11)

## 2022-10-23 PROCEDURE — 84478 ASSAY OF TRIGLYCERIDES: CPT

## 2022-10-23 PROCEDURE — 85025 COMPLETE CBC W/AUTO DIFF WBC: CPT

## 2022-10-23 PROCEDURE — 6360000002 HC RX W HCPCS: Performed by: PHYSICIAN ASSISTANT

## 2022-10-23 PROCEDURE — 2580000003 HC RX 258: Performed by: INTERNAL MEDICINE

## 2022-10-23 PROCEDURE — 36415 COLL VENOUS BLD VENIPUNCTURE: CPT

## 2022-10-23 PROCEDURE — 83690 ASSAY OF LIPASE: CPT

## 2022-10-23 PROCEDURE — 80053 COMPREHEN METABOLIC PANEL: CPT

## 2022-10-23 RX ADMIN — SODIUM CHLORIDE: 9 INJECTION, SOLUTION INTRAVENOUS at 05:55

## 2022-10-23 RX ADMIN — KETOROLAC TROMETHAMINE 30 MG: 30 INJECTION, SOLUTION INTRAMUSCULAR at 10:53

## 2022-10-23 RX ADMIN — KETOROLAC TROMETHAMINE 30 MG: 30 INJECTION, SOLUTION INTRAMUSCULAR at 05:55

## 2022-10-23 ASSESSMENT — PAIN DESCRIPTION - ORIENTATION: ORIENTATION: MID;UPPER;LEFT

## 2022-10-23 ASSESSMENT — PAIN DESCRIPTION - DESCRIPTORS: DESCRIPTORS: SHARP

## 2022-10-23 ASSESSMENT — PAIN SCALES - GENERAL
PAINLEVEL_OUTOF10: 3
PAINLEVEL_OUTOF10: 0

## 2022-10-23 ASSESSMENT — PAIN DESCRIPTION - LOCATION: LOCATION: ABDOMEN

## 2022-10-23 NOTE — PROGRESS NOTES
Data- discharge order received, pt verbalized agreement to discharge, disposition to previous residence, no needs for HHC/DME. Action- discharge instructions prepared and given to pt, pt verbalized understanding. Medication information packet given r/t NEW and/or CHANGED prescriptions emphasizing name/purpose/side effects, pt verbalized understanding. Discharge instruction summary: Diet- regular, Activity- as tolerated, Primary Care Physician as follows: No primary care provider on file. None f/u appointment in 2 weeks, immunizations reviewed, prescription medications filled pt pharmacy. Inpatient surgical procedure precautions reviewed. 1. WEIGHT: Admit Weight: 132 lb 6 oz (60 kg) (10/22/22 1710)        Today  Weight: 132 lb 6 oz (60 kg) (10/22/22 1710)       2. O2 SAT.: SpO2: 91 % (10/23/22 1211)    Response- Pt belongings gathered, IV removed. Disposition is home (no HHC/DME needs), transported with nurses, taken to lobby via w/c w/ walking, no complications.

## 2022-10-23 NOTE — DISCHARGE SUMMARY
Hospital Medicine Discharge Summary    Patient: Tavia Rooney     Gender: male  : 1980   Age: 37 y.o. MRN: [id number]    Admitting Physician: Kevin Jones MD  Discharge Physician: Kevin Jones MD     Code Status: Full Code     Admit Date: 10/22/2022   Discharge Date:   10/23/2022    Disposition:  Home  Time spent arranging discharge: 35 minutes    Discharge Diagnoses: Active Hospital Problems    Diagnosis Date Noted    Acute pancreatitis with uninfected necrosis [K85.91] 10/22/2022     Priority: Medium           Condition at Discharge:  Stable    Hospital Course:   Was admitted to hospital with possible acute on chronic pancreatitis patient was made n.p.o. started on IV fluids pain resolved tolerating p.o. diet patient was discharged home patient had blurry vision on admission which improved patient to follow-up with ophthalmology as outpatient    Discharge Exam:    /85   Pulse 74   Temp 97.9 °F (36.6 °C) (Oral)   Resp 18   Ht 5' 0.5\" (1.537 m)   Wt 132 lb 6 oz (60 kg)   SpO2 91%   BMI 25.43 kg/m²   General appearance:  Appears comfortable. AAOx3  HEENT: atraumatic, Pupils equal, muscous membranes moist, no masses appreciated  Cardiovascular: Regular rate and rhythm no murmurs appreciated  Respiratory: CTAB no wheezing  Gastrointestinal: Abdomen soft, non-tender, BS+  EXT: no edema  Neurology: no gross focal deficts  Psychiatry: Appropriate affect.  Not agitated  Skin: Warm, dry, no rashes appreciated    Discharge Medications:   Current Discharge Medication List        Current Discharge Medication List        Current Discharge Medication List        CONTINUE these medications which have NOT CHANGED    Details   ascorbic acid (V-R VITAMIN C) 250 MG tablet Take 1 tablet by mouth daily  Qty: 30 tablet, Refills: 1      metFORMIN (GLUCOPHAGE) 1000 MG tablet Take 1 tablet by mouth 2 times daily (with meals)  Qty: 30 tablet, Refills: 3    Associated Diagnoses: Type 2 diabetes mellitus with hyperosmolar coma, unspecified whether long term insulin use (HCC)      gemfibrozil (LOPID) 600 MG tablet Take 1 tablet by mouth 2 times daily (before meals)  Qty: 60 tablet, Refills: 3    Associated Diagnoses: Hypertriglyceridemia      insulin glargine (BASAGLAR KWIKPEN) 100 UNIT/ML injection pen Inject 20 Units into the skin nightly  Qty: 5 pen, Refills: 0      Insulin Pen Needle 31G X 8 MM MISC 1 each by Does not apply route daily  Qty: 100 each, Refills: 3      Alcohol Swabs PADS 1 applicator by Does not apply route 2 times daily  Qty: 100 each, Refills: 1      glucose monitoring (FREESTYLE FREEDOM) kit 1 kit by Does not apply route daily  Qty: 1 kit, Refills: 0      blood glucose monitor strips Test 2 times a day & as needed for symptoms of irregular blood glucose. Dispense sufficient amount for indicated testing frequency plus additional to accommodate PRN testing needs. Qty: 2 strip, Refills: 0    Comments: Brand per patient preference. May round up to next available package size. Lancets MISC 1 each by Does not apply route 2 times daily  Qty: 200 each, Refills: 0           Current Discharge Medication List        STOP taking these medications       ibuprofen (IBU) 800 MG tablet Comments:   Reason for Stopping:               Labs:  For convenience and continuity at follow-up the following most recent labs are provided:    Lab Results   Component Value Date/Time    WBC 7.0 10/23/2022 04:49 AM    HGB 11.6 10/23/2022 04:49 AM    HCT 33.5 10/23/2022 04:49 AM    MCV 90.2 10/23/2022 04:49 AM     10/23/2022 04:49 AM     10/23/2022 04:49 AM    K 4.1 10/23/2022 04:49 AM     10/23/2022 04:49 AM    CO2 24 10/23/2022 04:49 AM    BUN 11 10/23/2022 04:49 AM    CREATININE 0.7 10/23/2022 04:49 AM    CALCIUM 8.5 10/23/2022 04:49 AM    PHOS 2.5 07/21/2022 04:23 AM    ALKPHOS 123 10/23/2022 04:49 AM    ALT 15 10/23/2022 04:49 AM    AST 20 10/23/2022 04:49 AM    BILITOT 1.6 10/23/2022 04:49 AM    LABALBU 3.7 10/23/2022 04:49 AM    LDLCALC 60 08/30/2022 07:51 AM    TRIG 105 08/30/2022 07:51 AM     No results found for: INR    Radiology:  CT ABDOMEN PELVIS W IV CONTRAST Additional Contrast? None    Result Date: 10/22/2022  EXAMINATION: CT OF THE ABDOMEN AND PELVIS WITH CONTRAST 10/22/2022 11:11 am TECHNIQUE: CT of the abdomen and pelvis was performed with the administration of intravenous contrast. Multiplanar reformatted images are provided for review. Automated exposure control, iterative reconstruction, and/or weight based adjustment of the mA/kV was utilized to reduce the radiation dose to as low as reasonably achievable. COMPARISON: 07/19/2022 HISTORY: ORDERING SYSTEM PROVIDED HISTORY: abd pain - pancreatitis? TECHNOLOGIST PROVIDED HISTORY: Reason for exam:->abd pain - pancreatitis? Additional Contrast?->None Decision Support Exception - unselect if not a suspected or confirmed emergency medical condition->Emergency Medical Condition (MA) Reason for Exam: abd pain- pancreatitis? Additional signs and symptoms: Abdominal Pain (Upper abdominal pain, feeling cold, concerned for either low or high blood sugar, also states blurry vision x2 days. BS has been 140-200) FINDINGS: Lower Chest: A noncalcified, less than 3 mm nodule in the left lower lobe is stable since 07/19/2022. There are multiple additional calcified granuloma in the lung bases. Organs: There is mild fatty infiltration of the liver. In the periphery of the right lobe of the liver there is a 5 mm hypodensity with peripheral enhancement, consistent with hemangioma. No gallbladder stones are identified. Calcifications are noted in the pancreatic body and head. Previous pancreatic edema nearly resolved. There is small amount of fluid along the left anterior pararenal fascia, measuring 5 mm in thickness (previously 10 mm). The spleen and adrenal glands are unremarkable. The kidneys enhance symmetrically.   2 cm angiomyolipoma in the inferior pole of the left kidney is stable; no specific follow-up recommended. GI/Bowel: The gastric antrum is contracted, likely physiological.  No dilated loops of small bowel are identified. There is no focal mural thickening of the colon. Mild prominence of stool is in the rectum. Pelvis: Urinary bladder and prostate gland are unremarkable. Peritoneum/Retroperitoneum: No adenopathy. No aneurysm. Bones/Soft Tissues: No acute osseous abnormality. Abdominal wall is unremarkable. Chronic or subacute pancreatitis. The majority of pancreatic stranding has resolved. There is minimal residual fluid along the left anterior pararenal fascia. Hepatic steatosis. Less than 3 mm nodule in the left lower lobe, stable in the 4 month interval. No follow-up is recommended in the absence of risk factors. Otherwise, 12 month follow-up CT chest is recommended. RECOMMENDATIONS: Radiology 2017 http://pubs. rsna.org/doi/full/10.1148/radiol. [email or web address]     XR CHEST PORTABLE    Result Date: 10/22/2022  EXAMINATION: ONE XRAY VIEW OF THE CHEST 10/22/2022 7:34 am COMPARISON: None. HISTORY: ORDERING SYSTEM PROVIDED HISTORY: abd pain TECHNOLOGIST PROVIDED HISTORY: Reason for exam:->abd pain Reason for Exam: Abdominal Pain (Upper abdominal pain, feeling cold, concerned for either low or high blood sugar, also states blurry vision x2 days. BS has been 140-200) FINDINGS: The cardial-pericardial silhouette is unremarkable in appearance. The lungs are clear. No pneumothorax is found. No free air is seen. No acute bony abnormality. Unremarkable portable chest radiograph.          Signed:    Batsheva Pringle MD   10/23/2022

## 2022-10-23 NOTE — PROGRESS NOTES
PatientA&Ox4. Sleptatnight. Fluidsasordered. AwareofNPOstatus. Painmedx1. NoBM. Abdpainmoderatewhenawake. Ambulatesperself. Hasonjeans. Pjbottombroughtinroom.

## 2022-10-23 NOTE — PROGRESS NOTES
Morning assessment completed, vss, denies pain this morning, has schedule Toradol, oral pill held per patient's diet status, insulin held since patient is NPO, understands basic english,The care plan and education has been reviewed and mutually agreed upon with the patient. Independent in the room, bed in low position, call light within reach.

## 2022-10-23 NOTE — PLAN OF CARE
Problem: Discharge Planning  Goal: Discharge to home or other facility with appropriate resources  Outcome: Progressing     Problem: Pain  Goal: Verbalizes/displays adequate comfort level or baseline comfort level  10/23/2022 0952 by Kimo Watson RN  Outcome: Progressing  10/22/2022 2033 by Ansley Aiken RN  Outcome: Progressing

## 2022-10-23 NOTE — PLAN OF CARE
Problem: Discharge Planning  Goal: Discharge to home or other facility with appropriate resources  10/23/2022 1434 by Armando Butler RN  Outcome: Progressing  10/23/2022 0952 by Armando Butler RN  Outcome: Progressing     Problem: Pain  Goal: Verbalizes/displays adequate comfort level or baseline comfort level  10/23/2022 1434 by Armando Butler RN  Outcome: Progressing  10/23/2022 0952 by Armando Butler RN  Outcome: Progressing

## 2022-10-23 NOTE — PLAN OF CARE
Problem: Pain  Goal: Verbalizes/displays adequate comfort level or baseline comfort level  10/22/2022 2033 by Nando Miramontes RN  Outcome: Progressing  10/22/2022 1744 by Naila Mclean RN  Outcome: Progressing  Flowsheets (Taken 10/22/2022 1640)  Verbalizes/displays adequate comfort level or baseline comfort level:   Encourage patient to monitor pain and request assistance   Assess pain using appropriate pain scale   Administer analgesics based on type and severity of pain and evaluate response   Tolerable,prefers  Less  Pain.

## 2022-10-23 NOTE — CARE COORDINATION
CM reviewed chart for d/c planning. Pt has no insurance listed. CM left vm for [place] in financial services [id number] so she can follow up with pt tomorrow. Pt will need meds to bed. Review of chart shows when pt here 7/21/22 was provided with [facility] clinic appt follow up and [facility] rehab information. GI consult pending. CM will continue to follow for d/c plan.     Ana Paula Wiggins RN, BSN  [phone]

## 2022-10-23 NOTE — CONSULTS
[address] [facility]  GI Consult Note    Patient: Jo Vasquez  : 1980  Acct#:      Date:  10/23/2022    Subjective:       History of Present Illness    I saw pt prior to being d/c home. Patient is a 37 y.o.  male whom we are consulted for pancreatitis. Hx pancreataitis 2022 felt from etoh vs triglycerides, rec no etoh and started on trig meds and rec outpt eus (doesn't appear done). Returned now with recurrent pain. He denies resuming etoh. Not clear if taking trig meds. However when I rounded this afternoon pt stated pain resolved and was very anxious to leave hospital to return to work tomrrow. Past Medical History:   Diagnosis Date    Acute pancreatitis     Diabetes mellitus ([facility] [address]       Past Surgical History:   Procedure Laterality Date    APPENDECTOMY          Admission Meds  No current facility-administered medications on file prior to encounter. Current Outpatient Medications on File Prior to Encounter   Medication Sig Dispense Refill    ascorbic acid (V-R VITAMIN C) 250 MG tablet Take 1 tablet by mouth daily 30 tablet 1    metFORMIN (GLUCOPHAGE) 1000 MG tablet Take 1 tablet by mouth 2 times daily (with meals) 30 tablet 3    gemfibrozil (LOPID) 600 MG tablet Take 1 tablet by mouth 2 times daily (before meals) (Patient not taking: Reported on 10/22/2022) 60 tablet 3    insulin glargine (BASAGLAR KWIKPEN) 100 UNIT/ML injection pen Inject 20 Units into the skin nightly 5 pen 0    Insulin Pen Needle 31G X 8 MM MISC 1 each by Does not apply route daily 100 each 3    Alcohol Swabs PADS 1 applicator by Does not apply route 2 times daily 100 each 1    glucose monitoring (FREESTYLE FREEDOM) kit 1 kit by Does not apply route daily 1 kit 0    blood glucose monitor strips Test 2 times a day & as needed for symptoms of irregular blood glucose. Dispense sufficient amount for indicated testing frequency plus additional to accommodate PRN testing needs.  2 strip 0    Lancets MISC 1 each by Does not apply route 2 times daily 200 each 0    [DISCONTINUED] ibuprofen (IBU) 800 MG tablet Take 1 tablet by mouth every 6 hours as needed for Pain. 120 tablet 0         Allergies  No Known Allergies     Family history     Family History   Problem Relation Age of Onset    No Known Problems Mother     No Known Problems Father         Social   Social History     Tobacco Use    Smoking status: Never    Smokeless tobacco: Never   Substance Use Topics    Alcohol use: Not on file     Comment: weekend          Review of Systems    Constitutional: negative  Respiratory: negative  Cardiovascular: negative  Gastrointestinal: as above  Musculoskeletal:negative  Neurological: negative         Physical Exam  Blood pressure 130/85, pulse 74, temperature 97.9 °F (36.6 °C), temperature source Oral, resp. rate 18, height 5' 0.5\" (1.537 m), weight 132 lb 6 oz (60 kg), SpO2 91 %. General appearance: alert, cooperative, no distress, appears stated age  Anicteric, No Jaundice  Head: Normocephalic, without obvious abnormality  Lungs: clear to auscultation bilaterally  Heart: regular rate and rhythm  Abdomen: soft, non-tender on exam today, non-distended, Bowel sounds normal.    Extremities: no edema  Skin: warm and dry  Neuro: alert and oriented      Data Review:    Recent Labs     10/22/22  1020 10/23/22  0449   WBC 4.5 7.0   HGB 13.0* 11.6*   HCT 38.1* 33.5*   MCV 90.8 90.2    292     Recent Labs     10/22/22  1020 10/23/22  0449   * 135*   K 4.7 4.1   CL 92* 101   CO2 27 24   BUN 12 11   CREATININE 0.6* 0.7*     Recent Labs     10/22/22  1020 10/23/22  0449   AST 27 20   ALT 19 15   BILITOT 1.0 1.6*   ALKPHOS 114 123     Recent Labs     10/22/22  1020 10/23/22  0449   LIPASE 20.0 39.0     No results for input(s): PROTIME, INR in the last 72 hours. No results for input(s): PTT in the last 72 hours. No results for input(s): OCCULTBLD in the last 72 hours.     Ct: pancreatitis actually improved from prior ct, some calcification/chronic pancreatitis changes. Liver hemangioma. Assessment / Plan :    Pancreatitis: pain resolved today. I rec pt tolerated low fat diet pain free before leaving, not clear if that occurred but pt was anxious to go home to return to work  Rec  Remain off work   Remain on lopid and f/u pcp ensure trig well controlled  Can call office outpt check eus    2. ?hx liver lesions at prior imaging in July 2022: ct here just commented liver hemangioma. Pt can f/u pcp compare priro images, ? Previously was just seeing hemangioma or further imaging needed. Pt was noted to be dc home when I completed my consult note ( I saw him earlier this afternoon).        Ellis Smith MD , MD  [facility] and [facility]

## 2022-10-24 LAB — TRIGL SERPL-MCNC: 298 MG/DL (ref 0–150)

## 2022-12-16 ENCOUNTER — OFFICE VISIT (OUTPATIENT)
Dept: INTERNAL MEDICINE CLINIC | Age: 42
End: 2022-12-16

## 2022-12-16 VITALS
OXYGEN SATURATION: 99 % | SYSTOLIC BLOOD PRESSURE: 132 MMHG | BODY MASS INDEX: 26.45 KG/M2 | HEART RATE: 73 BPM | WEIGHT: 140.1 LBS | TEMPERATURE: 98.1 F | HEIGHT: 61 IN | DIASTOLIC BLOOD PRESSURE: 81 MMHG | RESPIRATION RATE: 16 BRPM

## 2022-12-16 DIAGNOSIS — Z11.59 NEED FOR HEPATITIS C SCREENING TEST: ICD-10-CM

## 2022-12-16 DIAGNOSIS — E78.1 HYPERTRIGLYCERIDEMIA: ICD-10-CM

## 2022-12-16 DIAGNOSIS — E11.01 TYPE 2 DIABETES MELLITUS WITH HYPEROSMOLAR COMA, UNSPECIFIED WHETHER LONG TERM INSULIN USE (HCC): Primary | ICD-10-CM

## 2022-12-16 DIAGNOSIS — F10.11 HISTORY OF ETOH ABUSE: ICD-10-CM

## 2022-12-16 DIAGNOSIS — Z11.4 ENCOUNTER FOR SCREENING FOR HIV: ICD-10-CM

## 2022-12-16 DIAGNOSIS — M79.10 MUSCLE PAIN: ICD-10-CM

## 2022-12-16 DIAGNOSIS — Z23 NEED FOR TDAP VACCINATION: ICD-10-CM

## 2022-12-16 DIAGNOSIS — Z23 NEEDS FLU SHOT: ICD-10-CM

## 2022-12-16 LAB — HBA1C MFR BLD: 9.8 %

## 2022-12-16 PROCEDURE — 90715 TDAP VACCINE 7 YRS/> IM: CPT | Performed by: STUDENT IN AN ORGANIZED HEALTH CARE EDUCATION/TRAINING PROGRAM

## 2022-12-16 PROCEDURE — 90471 IMMUNIZATION ADMIN: CPT | Performed by: STUDENT IN AN ORGANIZED HEALTH CARE EDUCATION/TRAINING PROGRAM

## 2022-12-16 PROCEDURE — 6360000002 HC RX W HCPCS: Performed by: STUDENT IN AN ORGANIZED HEALTH CARE EDUCATION/TRAINING PROGRAM

## 2022-12-16 PROCEDURE — 99213 OFFICE O/P EST LOW 20 MIN: CPT | Performed by: STUDENT IN AN ORGANIZED HEALTH CARE EDUCATION/TRAINING PROGRAM

## 2022-12-16 PROCEDURE — 90674 CCIIV4 VAC NO PRSV 0.5 ML IM: CPT | Performed by: STUDENT IN AN ORGANIZED HEALTH CARE EDUCATION/TRAINING PROGRAM

## 2022-12-16 PROCEDURE — G0008 ADMIN INFLUENZA VIRUS VAC: HCPCS | Performed by: STUDENT IN AN ORGANIZED HEALTH CARE EDUCATION/TRAINING PROGRAM

## 2022-12-16 RX ORDER — MENTHOL 16 %
FOAM (ML) TOPICAL 2 TIMES DAILY PRN
Qty: 1 EACH | Refills: 1 | Status: SHIPPED | OUTPATIENT
Start: 2022-12-16

## 2022-12-16 RX ORDER — INSULIN GLARGINE 100 [IU]/ML
20 INJECTION, SOLUTION SUBCUTANEOUS NIGHTLY
Qty: 5 ADJUSTABLE DOSE PRE-FILLED PEN SYRINGE | Refills: 4 | Status: SHIPPED | OUTPATIENT
Start: 2022-12-16

## 2022-12-16 RX ORDER — LANCETS 30 GAUGE
1 EACH MISCELLANEOUS 2 TIMES DAILY
Qty: 200 EACH | Refills: 0 | Status: SHIPPED | OUTPATIENT
Start: 2022-12-16

## 2022-12-16 RX ORDER — ASCORBIC ACID 250 MG
250 TABLET ORAL DAILY
Qty: 30 TABLET | Refills: 1 | Status: SHIPPED | OUTPATIENT
Start: 2022-12-16

## 2022-12-16 RX ORDER — GEMFIBROZIL 600 MG/1
600 TABLET, FILM COATED ORAL
Qty: 60 TABLET | Refills: 3 | Status: SHIPPED | OUTPATIENT
Start: 2022-12-16

## 2022-12-16 RX ORDER — GLUCOSAMINE HCL/CHONDROITIN SU 500-400 MG
CAPSULE ORAL
Qty: 2 STRIP | Refills: 0 | Status: SHIPPED | OUTPATIENT
Start: 2022-12-16

## 2022-12-16 RX ADMIN — INFLUENZA A VIRUS A/DELAWARE/55/2019 CVR-45 (H1N1) ANTIGEN (MDCK CELL DERIVED, PROPIOLACTONE INACTIVATED), INFLUENZA A VIRUS A/DARWIN/11/2021 (H3N2) ANTIGEN (MDCK CELL DERIVED, PROPIOLACTONE INACTIVATED), INFLUENZA B VIRUS B/SINGAPORE/WUH4618/2021 ANTIGEN (MDCK CELL DERIVED, PROPIOLACTONE INACTIVATED), INFLUENZA B VIRUS B/SINGAPORE/INFTT-16-0610/2016 ANTIGEN (MDCK CELL DERIVED, PROPIOLACTONE INACTIVATED) 0.5 ML: 15; 15; 15; 15 INJECTION, SUSPENSION INTRAMUSCULAR at 11:29

## 2022-12-16 RX ADMIN — TETANUS TOXOID, REDUCED DIPHTHERIA TOXOID AND ACELLULAR PERTUSSIS VACCINE, ADSORBED 0.5 ML: 5; 2.5; 8; 8; 2.5 SUSPENSION INTRAMUSCULAR at 11:27

## 2022-12-16 NOTE — PROGRESS NOTES
[facility]    Vitor Napoles is a 40 y.o. male, here for evaluation of the following concerns:    Patient is here today for routine follow-up. He complains of L-sided neck pain that radiates to his shoulder. This feel better when he stretches his neck, feels it is related to stress. Denies palpitations, SOB, diaphoresis, nausea. He checks his blood sugar in AM fasting and once in afternoon after eating. Last A1c Oct 2022, was 8.7%. 112 AM glucose 3 days  180 after eating 3 days ago  267 after eating yesterday  237 fasting this morning --- he believes this is related to drinking a sugary drink last night. He is noting patterns that when he eats lower carb diet, his sugar is under better control. He has been out of his insulin for a week. He is no longer drinking alcohol. Last drink in September. For the last 3 years, whenever he drinks he gets pancreatitis. He denies abdominal pain or digestive problems. He was hospitalized for pancreatitis due to alcohol in Oct 2022. Review of Systems   All other systems reviewed and are negative. MEDICATIONS:  Prior to Visit Medications    Medication Sig Taking?  Authorizing Provider   ascorbic acid (V-R VITAMIN C) 250 MG tablet Take 1 tablet by mouth daily  Rhoda Yao MD   metFORMIN (GLUCOPHAGE) 1000 MG tablet Take 1 tablet by mouth 2 times daily (with meals)  Kylie Ceja MD   gemfibrozil (LOPID) 600 MG tablet Take 1 tablet by mouth 2 times daily (before meals)  Patient not taking: Reported on 10/22/2022  Kylie Ceja MD   insulin glargine [facility]) 100 UNIT/ML injection pen Inject 20 Units into the skin nightly  Jamison Canseco MD   Insulin Pen Needle 31G X 8 MM MISC 1 each by Does not apply route daily  Jamison Canseco MD   Alcohol Swabs PADS 1 applicator by Does not apply route 2 times daily  Jamison Canseco MD   glucose monitoring (FREESTYLE FREEDOM) kit 1 kit by Does not apply route daily  Frank Howell MD   blood glucose monitor strips Test 2 times a day & as needed for symptoms of irregular blood glucose. Dispense sufficient amount for indicated testing frequency plus additional to accommodate PRN testing needs. Frank Howell MD   Lancets MISC 1 each by Does not apply route 2 times daily  Frank Howell MD   ibuprofen (IBU) 800 MG tablet Take 1 tablet by mouth every 6 hours as needed for Pain. Deya Sorrel, [place]        Vitals:    12/16/22 1103 12/16/22 1105   BP: 131/83 132/81   Site: Left Upper Arm Right Upper Arm   Position: Sitting Sitting   Cuff Size: Medium Adult Medium Adult   Pulse: 73    Resp: 16    Temp: 98.1 °F (36.7 °C)    TempSrc: Temporal    SpO2: 99%    Weight: 140 lb 1.6 oz (63.5 kg)    Height: 5' 1\" (1.549 m)       Estimated body mass index is 26.47 kg/m² as calculated from the following:    Height as of this encounter: 5' 1\" (1.549 m). Weight as of this encounter: 140 lb 1.6 oz (63.5 kg). Physical Exam  Constitutional:       Appearance: Normal appearance. He is normal weight. HENT:      Head: Normocephalic. Eyes:      Extraocular Movements: Extraocular movements intact. Conjunctiva/sclera: Conjunctivae normal.   Cardiovascular:      Rate and Rhythm: Normal rate and regular rhythm. Pulses: Normal pulses. Heart sounds: Normal heart sounds. No murmur heard. No gallop. Pulmonary:      Effort: Pulmonary effort is normal. No respiratory distress. Breath sounds: Normal breath sounds. No wheezing, rhonchi or rales. Abdominal:      General: Abdomen is flat. There is no distension. Palpations: Abdomen is soft. Tenderness: There is no abdominal tenderness. Musculoskeletal:         General: No swelling or tenderness. Cervical back: No rigidity or tenderness. Skin:     General: Skin is warm and dry. Coloration: Skin is not jaundiced. Neurological:      General: No focal deficit present.       Mental Status: He is alert and oriented to person, place, and time. Mental status is at baseline. Psychiatric:         Mood and Affect: Mood normal.         Behavior: Behavior normal.         Thought Content: Thought content normal.       ASSESSMENT/PLAN:     1. Type 2 diabetes mellitus with hyperosmolar coma, unspecified whether long term insulin use (HCC)  HbA1c up today to 9.6% from 8.7% in Oct 2022.  -     metFORMIN (GLUCOPHAGE) 1000 MG tablet; Take 1 tablet by mouth 2 times daily (with meals), Disp-30 tablet, R-3Normal  -     Lancets MISC; 2 TIMES DAILY Starting Fri 12/16/2022, Disp-200 each, R-0, Normal  -     Insulin Pen Needle 31G X 8 MM MISC; DAILY Starting Fri 12/16/2022, Disp-100 each, R-3, Normal  -     insulin glargine (BASAGLAR KWIKPEN) 100 UNIT/ML injection pen; Inject 20 Units into the skin nightly, Disp-5 Adjustable Dose Pre-filled Pen Syringe, R-4Normal  -     blood glucose monitor strips; Test 2 times a day & as needed for symptoms of irregular blood glucose. Dispense sufficient amount for indicated testing frequency plus additional to accommodate PRN testing needs. , Disp-2 strip, R-0, Normal  - patient was instructed to increased his insulin dose one unit daily until he achieves an AM fasting glucose <140  - information regarding low carb diet provided    2. Muscle pain  Lifts heavy things for work. Feel better with massage, stretching.  -     menthol (ICY HOT ORIGINAL PAIN RELIEF) 2.5 % GEL topical gel; Apply topically 2 times daily as needed for Other (pain), Disp-1 each, R-1Normal  - ice area    3. History of ETOH abuse  Alcoholic pancreatits history, most recently admitted 10/2022  - advised regarding alcohol cessation    4. Needs flu shot  -     influenza quadrivalent subunit vaccine (FLUCELVAX) injection 0.5 mL; 0.5 mL, IntraMUSCular, ONCE, 1 dose, On Fri 12/16/22 at 1145If not given at scheduled administration time, reschedule for next day. 5. Hypertriglyceridemia  -     gemfibrozil (LOPID) 600 MG tablet;  Take 1 tablet by mouth 2 times daily (before meals), Disp-60 tablet, R-3Normal    6. Need for Tdap vaccination  -     Tetanus-Diphth-Acell Pertussis (BOOSTRIX) injection 0.5 mL; 0.5 mL, IntraMUSCular, ONCE, 1 dose, On Fri 12/16/22 at 1145    Return in about 3 months (around 3/16/2023). The patient was staffed with the teaching attending: Dr. Marcelo Day. An electronic signature was used to authenticate this note.     --Xavier Ramírez MD

## 2022-12-16 NOTE — PATIENT INSTRUCTIONS
- chequear los azucares- cuando esta arriba de 140 por la [place], agregar 1 unidad de insulina adolfo raji. Cuando encuentra la dosis que [employer] azucares (bajo de 140 por la manana), no bajar la insulina de nuevo --- eso es la dosis nueva.   - seguir tomando todos tus keri  - obtener los examenes de [place]  - usar la crema 'icy hot' para el dolor de los musculos  - regrezar a vernos en 3 meses

## 2022-12-28 ENCOUNTER — TELEPHONE (OUTPATIENT)
Dept: INTERNAL MEDICINE CLINIC | Age: 42
End: 2022-12-28

## 2022-12-28 NOTE — TELEPHONE ENCOUNTER
PT STATED THE INSULIN RX COST TOO MUCH AND HE NEED A DIFFERENT INSULIN THAT HE CAN AFFORD. PT CAN BE REACHED [phone] PT NEED [place] INTERP WHEN CALLING BACK.

## 2023-03-08 ENCOUNTER — HOSPITAL ENCOUNTER (EMERGENCY)
Age: 43
Discharge: HOME | End: 2023-03-08
Attending: EMERGENCY MEDICINE

## 2023-03-08 VITALS
DIASTOLIC BLOOD PRESSURE: 84 MMHG | BODY MASS INDEX: 26.4 KG/M2 | OXYGEN SATURATION: 100 % | HEART RATE: 89 BPM | RESPIRATION RATE: 18 BRPM | SYSTOLIC BLOOD PRESSURE: 124 MMHG | TEMPERATURE: 98.5 F | WEIGHT: 139.7 LBS

## 2023-03-08 DIAGNOSIS — R73.9 HYPERGLYCEMIA: Primary | ICD-10-CM

## 2023-03-08 DIAGNOSIS — E87.1 HYPONATREMIA: ICD-10-CM

## 2023-03-08 LAB
A/G RATIO: 1.4 (ref 1.1–2.2)
ALBUMIN SERPL-MCNC: 4.2 G/DL (ref 3.4–5)
ALP BLD-CCNC: 192 U/L (ref 40–129)
ALT SERPL-CCNC: 12 U/L (ref 10–40)
ANION GAP SERPL CALCULATED.3IONS-SCNC: 11 MMOL/L (ref 3–16)
AST SERPL-CCNC: 21 U/L (ref 15–37)
BASE EXCESS VENOUS: 3 MMOL/L (ref -3–3)
BASOPHILS ABSOLUTE: 0 K/UL (ref 0–0.2)
BASOPHILS RELATIVE PERCENT: 0.6 %
BETA-HYDROXYBUTYRATE: 0.1 MMOL/L (ref 0–0.27)
BILIRUB SERPL-MCNC: 0.6 MG/DL (ref 0–1)
BILIRUBIN URINE: NEGATIVE
BLOOD, URINE: NEGATIVE
BUN BLDV-MCNC: 9 MG/DL (ref 7–20)
CALCIUM SERPL-MCNC: 9.4 MG/DL (ref 8.3–10.6)
CARBOXYHEMOGLOBIN: 3.5 % (ref 0–1.5)
CHLORIDE BLD-SCNC: 88 MMOL/L (ref 99–110)
CLARITY: CLEAR
CO2: 25 MMOL/L (ref 21–32)
COLOR: YELLOW
CREAT SERPL-MCNC: 0.6 MG/DL (ref 0.9–1.3)
EOSINOPHILS ABSOLUTE: 0.1 K/UL (ref 0–0.6)
EOSINOPHILS RELATIVE PERCENT: 1.5 %
GFR SERPL CREATININE-BSD FRML MDRD: >60 ML/MIN/{1.73_M2}
GLUCOSE BLD-MCNC: 299 MG/DL (ref 70–99)
GLUCOSE BLD-MCNC: 310 MG/DL (ref 70–99)
GLUCOSE URINE: >=1000 MG/DL
HCO3 VENOUS: 27.8 MMOL/L (ref 23–29)
HCT VFR BLD CALC: 34.1 % (ref 40.5–52.5)
HEMOGLOBIN: 11.8 G/DL (ref 13.5–17.5)
KETONES, URINE: NEGATIVE MG/DL
LEUKOCYTE ESTERASE, URINE: NEGATIVE
LYMPHOCYTES ABSOLUTE: 1.7 K/UL (ref 1–5.1)
LYMPHOCYTES RELATIVE PERCENT: 29.9 %
MCH RBC QN AUTO: 32.1 PG (ref 26–34)
MCHC RBC AUTO-ENTMCNC: 34.6 G/DL (ref 31–36)
MCV RBC AUTO: 93 FL (ref 80–100)
METHEMOGLOBIN VENOUS: 0.5 %
MICROSCOPIC EXAMINATION: ABNORMAL
MONOCYTES ABSOLUTE: 0.4 K/UL (ref 0–1.3)
MONOCYTES RELATIVE PERCENT: 7 %
NEUTROPHILS ABSOLUTE: 3.5 K/UL (ref 1.7–7.7)
NEUTROPHILS RELATIVE PERCENT: 61 %
NITRITE, URINE: NEGATIVE
O2 CONTENT, VEN: 17 VOL %
O2 SAT, VEN: 100 %
O2 THERAPY: ABNORMAL
PCO2, VEN: 42.3 MMHG (ref 40–50)
PDW BLD-RTO: 13.2 % (ref 12.4–15.4)
PERFORMED ON: ABNORMAL
PH UA: 7 (ref 5–8)
PH VENOUS: 7.43 (ref 7.35–7.45)
PLATELET # BLD: 261 K/UL (ref 135–450)
PMV BLD AUTO: 7.7 FL (ref 5–10.5)
PO2, VEN: 177 MMHG (ref 25–40)
POTASSIUM SERPL-SCNC: 4.2 MMOL/L (ref 3.5–5.1)
PROTEIN UA: NEGATIVE MG/DL
RBC # BLD: 3.67 M/UL (ref 4.2–5.9)
SODIUM BLD-SCNC: 124 MMOL/L (ref 136–145)
SPECIFIC GRAVITY UA: 1.01 (ref 1–1.03)
TCO2 CALC VENOUS: 65 MMOL/L
TOTAL PROTEIN: 7.3 G/DL (ref 6.4–8.2)
URINE REFLEX TO CULTURE: ABNORMAL
URINE TYPE: ABNORMAL
UROBILINOGEN, URINE: 0.2 E.U./DL
WBC # BLD: 5.7 K/UL (ref 4–11)

## 2023-03-08 PROCEDURE — 82803 BLOOD GASES ANY COMBINATION: CPT

## 2023-03-08 PROCEDURE — 99284 EMERGENCY DEPT VISIT MOD MDM: CPT

## 2023-03-08 PROCEDURE — 6360000002 HC RX W HCPCS: Performed by: NURSE PRACTITIONER

## 2023-03-08 PROCEDURE — 81003 URINALYSIS AUTO W/O SCOPE: CPT

## 2023-03-08 PROCEDURE — 82010 KETONE BODYS QUAN: CPT

## 2023-03-08 PROCEDURE — 2580000003 HC RX 258: Performed by: NURSE PRACTITIONER

## 2023-03-08 PROCEDURE — 96374 THER/PROPH/DIAG INJ IV PUSH: CPT

## 2023-03-08 PROCEDURE — 85025 COMPLETE CBC W/AUTO DIFF WBC: CPT

## 2023-03-08 PROCEDURE — 93005 ELECTROCARDIOGRAM TRACING: CPT | Performed by: EMERGENCY MEDICINE

## 2023-03-08 PROCEDURE — 80053 COMPREHEN METABOLIC PANEL: CPT

## 2023-03-08 RX ORDER — 0.9 % SODIUM CHLORIDE 0.9 %
1000 INTRAVENOUS SOLUTION INTRAVENOUS ONCE
Status: COMPLETED | OUTPATIENT
Start: 2023-03-08 | End: 2023-03-08

## 2023-03-08 RX ORDER — ONDANSETRON 2 MG/ML
4 INJECTION INTRAMUSCULAR; INTRAVENOUS EVERY 30 MIN PRN
Status: DISCONTINUED | OUTPATIENT
Start: 2023-03-08 | End: 2023-03-09 | Stop reason: HOSPADM

## 2023-03-08 RX ADMIN — SODIUM CHLORIDE 1000 ML: 9 INJECTION, SOLUTION INTRAVENOUS at 19:50

## 2023-03-08 RX ADMIN — ONDANSETRON 4 MG: 2 INJECTION INTRAMUSCULAR; INTRAVENOUS at 19:49

## 2023-03-08 RX ADMIN — SODIUM CHLORIDE 1000 ML: 9 INJECTION, SOLUTION INTRAVENOUS at 21:55

## 2023-03-08 ASSESSMENT — ENCOUNTER SYMPTOMS
SHORTNESS OF BREATH: 0
DIARRHEA: 0
ABDOMINAL PAIN: 0
VOMITING: 1
NAUSEA: 1
CHEST TIGHTNESS: 0

## 2023-03-08 ASSESSMENT — PAIN - FUNCTIONAL ASSESSMENT: PAIN_FUNCTIONAL_ASSESSMENT: 0-10

## 2023-03-08 ASSESSMENT — PAIN SCALES - GENERAL: PAINLEVEL_OUTOF10: 4

## 2023-03-08 NOTE — Clinical Note
Leandro Meade was seen and treated in our emergency department on 3/8/2023. He may return to work on 03/10/2023. If you have any questions or concerns, please don't hesitate to call.       To Mclain MD

## 2023-03-09 DIAGNOSIS — E87.1 HYPONATREMIA: ICD-10-CM

## 2023-03-09 LAB
ANION GAP SERPL CALCULATED.3IONS-SCNC: 10 MMOL/L (ref 3–16)
BUN BLDV-MCNC: 6 MG/DL (ref 7–20)
CALCIUM SERPL-MCNC: 9.7 MG/DL (ref 8.3–10.6)
CHLORIDE BLD-SCNC: 94 MMOL/L (ref 99–110)
CO2: 28 MMOL/L (ref 21–32)
CREAT SERPL-MCNC: 0.7 MG/DL (ref 0.9–1.3)
GFR SERPL CREATININE-BSD FRML MDRD: >60 ML/MIN/{1.73_M2}
GLUCOSE BLD-MCNC: 292 MG/DL (ref 70–99)
POTASSIUM SERPL-SCNC: 4.9 MMOL/L (ref 3.5–5.1)
SODIUM BLD-SCNC: 132 MMOL/L (ref 136–145)

## 2023-03-09 NOTE — ED PROVIDER NOTES
[facility]        Pt Name: Chet Vieira  MRN: [id number]  [place] 1980  Date of evaluation: 3/8/2023  Provider: JOHAN Smith CNP  PCP: Spencer Gerardo MD  Note Started: 7:17 PM EST 3/8/23       I have seen and evaluated this patient with my supervising physician Soheila Manning, *. CHIEF COMPLAINT       Chief Complaint   Patient presents with    Hyperglycemia     Head spinning, dizziness & emesis, since Sunday; BG 500s yesterday.  in triage; non med compliant, because insulin is too expensive       HISTORY OF PRESENT ILLNESS: 1 or more Elements     History from : Patient    Limitations to history : Language Polish    Chet Vieira is a 40 y.o. male who presents to the emergency department with complaint of head spinning/dizziness with nausea and vomiting since Sunday. Blood glucose was in the 500s yesterday, 310 today in triage. Patient is taking his metformin as prescribed however he is unable to afford his insulin and has been out of this medication for 1 month. No fever. Denies any fever, visual disturbances. No chest pain or pressure. No neck or back pain. No shortness of breath, cough, or congestion. No abdominal pain, diarrhea, constipation, or dysuria. No rash. Nursing Notes were all reviewed and agreed with or any disagreements were addressed in the HPI. REVIEW OF SYSTEMS :      Review of Systems   Constitutional:  Positive for fatigue. Negative for activity change, chills and fever. Respiratory:  Negative for chest tightness and shortness of breath. Cardiovascular:  Negative for chest pain. Gastrointestinal:  Positive for nausea and vomiting. Negative for abdominal pain and diarrhea. Genitourinary:  Negative for dysuria. Neurological:  Positive for dizziness. All other systems reviewed and are negative. Positives and Pertinent negatives as per HPI. SURGICAL HISTORY     Past Surgical History:   Procedure Laterality Date    APPENDECTOMY         CURRENTMEDICATIONS       Discharge Medication List as of 3/8/2023  9:22 PM        CONTINUE these medications which have NOT CHANGED    Details   metFORMIN (GLUCOPHAGE) 1000 MG tablet Take 1 tablet by mouth 2 times daily (with meals), Disp-30 tablet, R-3Normal      Lancets MISC 2 TIMES DAILY Starting Fri 12/16/2022, Disp-200 each, R-0, Normal      Insulin Pen Needle 31G X 8 MM MISC DAILY Starting Fri 12/16/2022, Disp-100 each, R-3, Normal      insulin glargine (BASAGLAR KWIKPEN) 100 UNIT/ML injection pen Inject 20 Units into the skin nightly, Disp-5 Adjustable Dose Pre-filled Pen Syringe, R-4Normal      gemfibrozil (LOPID) 600 MG tablet Take 1 tablet by mouth 2 times daily (before meals), Disp-60 tablet, R-3Normal      blood glucose monitor strips Test 2 times a day & as needed for symptoms of irregular blood glucose. Dispense sufficient amount for indicated testing frequency plus additional to accommodate PRN testing needs. , Disp-2 strip, R-0, Normal      ascorbic acid (V-R VITAMIN C) 250 MG tablet Take 1 tablet by mouth daily, Disp-30 tablet, R-1Normal      menthol (ICY HOT ORIGINAL PAIN RELIEF) 2.5 % GEL topical gel Apply topically 2 times daily as needed for Other (pain), Disp-1 each, R-1Normal      Alcohol Swabs PADS 2 TIMES DAILY Starting Thu 7/21/2022, Disp-100 each, R-1, Normal      glucose monitoring (FREESTYLE FREEDOM) kit DAILY Starting Thu 7/21/2022, Disp-1 kit, R-0, Normal             ALLERGIES     Patient has no known allergies.     FAMILYHISTORY       Family History   Problem Relation Age of Onset    No Known Problems Mother     No Known Problems Father         SOCIAL HISTORY       Social History     Tobacco Use    Smoking status: Never    Smokeless tobacco: Never   Substance Use Topics    Drug use: No       SCREENINGS                         CIWA Assessment  BP: 124/84  Heart Rate: 89           PHYSICAL EXAM  1 or more Elements     ED Triage Vitals [03/08/23 1849]   BP Temp Temp src Heart Rate Resp SpO2 Height Weight   124/84 98.5 °F (36.9 °C) -- 89 18 100 % -- 139 lb 11.2 oz (63.4 kg)       Physical Exam  Vitals and nursing note reviewed. Constitutional:       Appearance: He is well-developed. He is not diaphoretic. HENT:      Head: Normocephalic and atraumatic. Right Ear: External ear normal.      Left Ear: External ear normal.   Eyes:      General:         Right eye: No discharge. Left eye: No discharge. Neck:      Vascular: No JVD. Cardiovascular:      Rate and Rhythm: Normal rate. Pulses: Normal pulses. Pulmonary:      Effort: Pulmonary effort is normal. No respiratory distress. Breath sounds: Normal breath sounds. Abdominal:      Palpations: Abdomen is soft. Musculoskeletal:         General: Normal range of motion. Skin:     General: Skin is warm and dry. Coloration: Skin is not pale. Neurological:      Mental Status: He is alert. GCS: GCS eye subscore is 4. GCS verbal subscore is 5. GCS motor subscore is 6. Cranial Nerves: Cranial nerves 2-12 are intact. Sensory: Sensation is intact. Motor: Motor function is intact. Coordination: Coordination is intact.    Psychiatric:         Behavior: Behavior normal.           DIAGNOSTIC RESULTS   LABS:    Labs Reviewed   CBC WITH AUTO DIFFERENTIAL - Abnormal; Notable for the following components:       Result Value    RBC 3.67 (*)     Hemoglobin 11.8 (*)     Hematocrit 34.1 (*)     All other components within normal limits   COMPREHENSIVE METABOLIC PANEL - Abnormal; Notable for the following components:    Sodium 124 (*)     Chloride 88 (*)     Glucose 299 (*)     Creatinine 0.6 (*)     Alkaline Phosphatase 192 (*)     All other components within normal limits   BLOOD GAS, VENOUS - Abnormal; Notable for the following components:    pO2, Brennan 177.0 (*)     Carboxyhemoglobin 3.5 (*)     All other components within normal limits   URINALYSIS WITH REFLEX TO CULTURE - Abnormal; Notable for the following components:    Glucose, Ur >=1000 (*)     All other components within normal limits   POCT GLUCOSE - Abnormal; Notable for the following components:    POC Glucose 310 (*)     All other components within normal limits   BETA-HYDROXYBUTYRATE       When ordered only abnormal lab results are displayed. All other labs were within normal range or not returned as of this dictation. EKG: When ordered, EKG's are interpreted by the Emergency Department Physician in the absence of a cardiologist.  Please see their note for interpretation of EKG. RADIOLOGY:   Non-plain film images such as CT, Ultrasound and MRI are read by the radiologist. Plain radiographic images are visualized and preliminarily interpreted by the ED Provider with the below findings:        Interpretation per the Radiologist below, if available at the time of this note:    No orders to display     No results found. No results found. PROCEDURES   Unless otherwise noted below, none     Procedures    CRITICAL CARE TIME (.cctime)   There was a high probability of life-threatening clinical deterioration in the patient's condition requiring my urgent intervention. I personally saw the patient and independently provided 31 minutes of non-concurrent critical care out of the total shared critical care time provided, excluding separately reportable procedures. PAST MEDICAL HISTORY      has a past medical history of Acute pancreatitis and Diabetes mellitus ([facility] [address]      Chronic Conditions affecting Care: diabetes    EMERGENCY DEPARTMENT COURSE and DIFFERENTIAL DIAGNOSIS/MDM:   Vitals:    Vitals:    03/08/23 1849   BP: 124/84   Pulse: 89   Resp: 18   Temp: 98.5 °F (36.9 °C)   SpO2: 100%   Weight: 139 lb 11.2 oz (63.4 kg)       Patient was given the following medications:  Medications   0.9 % sodium chloride bolus (0 mLs IntraVENous Stopped 3/8/23 2050)   0.9 % sodium chloride IV bolus 1,000 mL (0 mLs IntraVENous Stopped 3/8/23 2202)             Is this patient to be included in the SEP-1 Core Measure due to severe sepsis or septic shock? No   Exclusion criteria - the patient is NOT to be included for SEP-1 Core Measure due to: Infection is not suspected    CONSULTS: (Who and What was discussed)  None  Discussion with Other Profesionals : None    Social Determinants : non-english speaking    Records Reviewed : None    CC/HPI Summary, DDx, ED Course, and Reassessment:     Briefly, this is a 40year old male who presents to the emergency department with complaint of head spinning/dizziness with nausea and vomiting since Sunday. Blood glucose was in the 500s yesterday, 310 today in triage. Patient is taking his metformin as prescribed however he is unable to afford his insulin and has been out of this medication for 1 month. VBG does show normal pH and normal bicarb. Urinalysis reveals glucosuria but no ketonuria. CBC is unremarkable. CMP does reveal a pseudohyponatremia with a glucose of 299 and sodium of 124, corrected sodium is between 127 and 129. Beta hydroxybutyrate 0.10. Patient given 2 L of IV fluids. Discharged with outpatient follow-up and strict return precautions. This patient is not in diabetic ketoacidosis. He is instructed that he needs to take insulin although he is unable to afford this, he does have an upcoming appoint with primary care and he will discuss this problem. I did offer the patient a coupon for Lantus. I completed a structured, evidence-based clinical evaluation to screen for acute stroke and neurologic deficits in this patient. The patient has a normal detailed neurologic exam, which is highly sensitive for dangerous causes of dizziness, vertigo, or loss of balance.  At this time there is no indication of head injury, encephalopathy, multiple sclerosis, TIA/CVA, seizures, dehydration, hypoglycemia, mass lesions, metabolic cause, cardiac arrhythmia, PE, GI bleeding or orthostatic hypotension. I have discussed with the patient my clinical impression and the result of an evidence-based clinical evaluation to screen for stroke, as well as the risk of further testing and hospitalization. The evidence shows that the risk for stroke is less than 1%. Although the risk of stroke has not been completely eliminated, the risks of further testing or hospitalization likely exceed any potential benefit, and the patient agrees with not pursuing further emergent evaluation or hospitalization for stroke evaluation at this time. Patient is stable throughout ED course and safe for outpatient follow-up. Patient made aware of treatment plan and verbally understood and agreed. Patient stable for outpatient follow-up with their family doctor in 24-48 hours. Disposition Considerations (include 1 Tests not done, Shared Decision Making, Pt Expectation of Test or Tx.): shared decision making used throughout    I did consider admission/observation and this was offered    Appropriate for outpatient management follow up with primary care      I am the Primary Clinician of Record. FINAL IMPRESSION      1. Hyperglycemia    2.  Hyponatremia          DISPOSITION/PLAN     DISPOSITION Decision To Discharge 03/08/2023 08:17:29 PM      PATIENT REFERRED TO:  Echo Stephenson MD  [facility]  [phone]    Schedule an appointment as soon as possible for a visit       [facility] Emergency Department  [facility] [address] [phone]  Go to   If symptoms worsen    DISCHARGE MEDICATIONS:  Discharge Medication List as of 3/8/2023  9:22 PM          DISCONTINUED MEDICATIONS:  Discharge Medication List as of 3/8/2023  9:22 PM        STOP taking these medications       ibuprofen (IBU) 800 MG tablet Comments:   Reason for Stopping:                      (Please note that portions of this note were completed with a voice recognition program.  Efforts were made to edit the dictations but occasionally words are mis-transcribed.)    JOHAN Langley CNP (electronically signed)            JOHAN Langley CNP  03/09/23 0024

## 2023-03-09 NOTE — ED PROVIDER NOTES
[facility] Emergency Department      Pt Name: Cristi Lao  MRN: [id number]  [place] 1980  Date of evaluation: 3/8/2023  Provider: Lynne Browning MD  I independently performed a history and physical on Cristi Lao. All diagnostic, treatment, and disposition decisions were made by myself in conjunction with the advanced practice provider. HPI: Cristi Lao presented with   Chief Complaint   Patient presents with    Hyperglycemia     Head spinning, dizziness & emesis, since Sunday; BG 500s yesterday.  in triage; non med compliant, because insulin is too expensive     Cristi Lao has a past medical history of Acute pancreatitis and Diabetes mellitus ([facility] [address] He has a past surgical history that includes Appendectomy. No current facility-administered medications on file prior to encounter. Current Outpatient Medications on File Prior to Encounter   Medication Sig Dispense Refill    metFORMIN (GLUCOPHAGE) 1000 MG tablet Take 1 tablet by mouth 2 times daily (with meals) 30 tablet 3    Lancets MISC 1 each by Does not apply route 2 times daily 200 each 0    Insulin Pen Needle 31G X 8 MM MISC 1 each by Does not apply route daily 100 each 3    insulin glargine (BASAGLAR KWIKPEN) 100 UNIT/ML injection pen Inject 20 Units into the skin nightly 5 Adjustable Dose Pre-filled Pen Syringe 4    gemfibrozil (LOPID) 600 MG tablet Take 1 tablet by mouth 2 times daily (before meals) 60 tablet 3    blood glucose monitor strips Test 2 times a day & as needed for symptoms of irregular blood glucose. Dispense sufficient amount for indicated testing frequency plus additional to accommodate PRN testing needs.  2 strip 0    ascorbic acid (V-R VITAMIN C) 250 MG tablet Take 1 tablet by mouth daily 30 tablet 1    menthol (ICY HOT ORIGINAL PAIN RELIEF) 2.5 % GEL topical gel Apply topically 2 times daily as needed for Other (pain) 1 each 1    Alcohol Swabs PADS 1 applicator by Does not apply route 2 times daily 100 each 1    glucose monitoring (FREESTYLE FREEDOM) kit 1 kit by Does not apply route daily 1 kit 0    [DISCONTINUED] ibuprofen (IBU) 800 MG tablet Take 1 tablet by mouth every 6 hours as needed for Pain. 120 tablet 0     PHYSICAL EXAM  Vitals: /84   Pulse 89   Temp 98.5 °F (36.9 °C)   Resp 18   Wt 139 lb 11.2 oz (63.4 kg)   SpO2 100%   BMI 26.40 kg/m²   Constitutional:  40 y.o. male alert, cooperative  HENT:  Atraumatic scalp, mucous membranes moist  Eyes:   Conjunctiva clear, no icterus  Neck:  Supple, no visible JVD, no signs of injury  Cardiovascular:  Regular, no rubs  Thorax & Lungs:  No accessory muscle usage, clear  Abdomen:  Soft, non distended, NT  Back:  No deformity  Genitalia:  Deferred  Rectal:  Deferred  Extremities:  No cyanosis, no edema, adequate perfusion  Skin:  Exposed areas warm, dry  Neurologic:  Alert, speech normal, mentation normal, pupils equal, normal coordination of extremities, no facial asymmetry, normal gait  Psychiatric:  Affect appropriate    MEDICAL DECISION MAKING:  Briefly, this is an 40 y.o.male who presented with concern for high sugar, episodic dizziness. Unable to afford insulin prescribed. He has hyperglycemia without DKA. He has hyponatremia. He received IV fluids. Preference is for outpatient follow up and filled out forms for financial assistance for meds. For further details of [facility] Emergency Department encounter, please see documentation by advanced practice provider Kelsi Negron CNP.      Labs Reviewed   CBC WITH AUTO DIFFERENTIAL - Abnormal; Notable for the following components:       Result Value    RBC 3.67 (*)     Hemoglobin 11.8 (*)     Hematocrit 34.1 (*)     All other components within normal limits   COMPREHENSIVE METABOLIC PANEL - Abnormal; Notable for the following components:    Sodium 124 (*)     Chloride 88 (*)     Glucose 299 (*)     Creatinine 0.6 (*)     Alkaline Phosphatase 192 (*)     All other components within normal limits   BLOOD GAS, VENOUS - Abnormal; Notable for the following components:    pO2, Brennan 177.0 (*)     Carboxyhemoglobin 3.5 (*)     All other components within normal limits   URINALYSIS WITH REFLEX TO CULTURE - Abnormal; Notable for the following components:    Glucose, Ur >=1000 (*)     All other components within normal limits   POCT GLUCOSE - Abnormal; Notable for the following components:    POC Glucose 310 (*)     All other components within normal limits   BETA-HYDROXYBUTYRATE     Vitals:    03/08/23 1849   BP: 124/84   Pulse: 89   Resp: 18   Temp: 98.5 °F (36.9 °C)   SpO2: 100%   Weight: 139 lb 11.2 oz (63.4 kg)     History from : Patient  Limitations to history : Language [place]  Chronic Conditions:  has a past medical history of Acute pancreatitis and Diabetes mellitus ([facility] [address] Records Reviewed : Outpatient Notes PCP visit in December  Disposition Considerations (Tests not ordered but considered, Shared Decision Making, Pt Expectation of Test or Tx.):  CT / MRI not deemed clinically necessary in the ED setting    Medications administered:  Medications   0.9 % sodium chloride bolus (0 mLs IntraVENous Stopped 3/8/23 2050)   0.9 % sodium chloride IV bolus 1,000 mL (0 mLs IntraVENous Stopped 3/8/23 2202)     FOLLOW UP:    Neri Carrillo MD  [address]  [phone]    Schedule an appointment as soon as possible for a visit       [facility] Emergency Department  [address]  Go to   If symptoms worsen    FINAL IMPRESSION:    1. Hyperglycemia    2.  Hyponatremia       DISPOSITION Decision To Discharge 03/08/2023 08:17:29 PM       Ml Weir MD  03/09/23 0562

## 2023-03-10 LAB
EKG ATRIAL RATE: 76 BPM
EKG DIAGNOSIS: NORMAL
EKG P AXIS: 51 DEGREES
EKG P-R INTERVAL: 174 MS
EKG Q-T INTERVAL: 362 MS
EKG QRS DURATION: 88 MS
EKG QTC CALCULATION (BAZETT): 407 MS
EKG R AXIS: -8 DEGREES
EKG T AXIS: 22 DEGREES
EKG VENTRICULAR RATE: 76 BPM

## 2023-03-10 PROCEDURE — 93010 ELECTROCARDIOGRAM REPORT: CPT | Performed by: INTERNAL MEDICINE

## 2023-03-17 ENCOUNTER — OFFICE VISIT (OUTPATIENT)
Dept: INTERNAL MEDICINE CLINIC | Age: 43
End: 2023-03-17

## 2023-03-17 VITALS
WEIGHT: 138.8 LBS | TEMPERATURE: 98.2 F | HEART RATE: 82 BPM | BODY MASS INDEX: 25.54 KG/M2 | SYSTOLIC BLOOD PRESSURE: 119 MMHG | DIASTOLIC BLOOD PRESSURE: 80 MMHG | HEIGHT: 62 IN | RESPIRATION RATE: 16 BRPM | OXYGEN SATURATION: 100 %

## 2023-03-17 DIAGNOSIS — E11.01 TYPE 2 DIABETES MELLITUS WITH HYPEROSMOLAR COMA, UNSPECIFIED WHETHER LONG TERM INSULIN USE (HCC): Primary | ICD-10-CM

## 2023-03-17 DIAGNOSIS — E78.1 HYPERTRIGLYCERIDEMIA: ICD-10-CM

## 2023-03-17 DIAGNOSIS — N52.9 ERECTILE DYSFUNCTION, UNSPECIFIED ERECTILE DYSFUNCTION TYPE: ICD-10-CM

## 2023-03-17 DIAGNOSIS — L03.019 PARONYCHIA OF FINGER, UNSPECIFIED LATERALITY: ICD-10-CM

## 2023-03-17 LAB — HBA1C MFR BLD: 11.7 %

## 2023-03-17 PROCEDURE — 83036 HEMOGLOBIN GLYCOSYLATED A1C: CPT

## 2023-03-17 PROCEDURE — 99213 OFFICE O/P EST LOW 20 MIN: CPT | Performed by: STUDENT IN AN ORGANIZED HEALTH CARE EDUCATION/TRAINING PROGRAM

## 2023-03-17 RX ORDER — GEMFIBROZIL 600 MG/1
600 TABLET, FILM COATED ORAL
Qty: 60 TABLET | Refills: 3 | Status: SHIPPED | OUTPATIENT
Start: 2023-03-17

## 2023-03-17 RX ORDER — NYSTATIN 100000 U/G
OINTMENT TOPICAL
Qty: 30 G | Refills: 0 | Status: SHIPPED | OUTPATIENT
Start: 2023-03-17

## 2023-03-17 ASSESSMENT — ENCOUNTER SYMPTOMS
SHORTNESS OF BREATH: 0
CHOKING: 0
DIARRHEA: 0
SORE THROAT: 0
COUGH: 0
ABDOMINAL PAIN: 0
VOMITING: 0
WHEEZING: 0

## 2023-03-17 NOTE — PATIENT INSTRUCTIONS
- empezar a aplicar el nystatin crema a los dedos afectados dose veces por raji  - para de usar el lantus (insulina) y empezar el NPH (insulina) 20 unitos cada noche  - moderarar los carbohidratos, seguir chequeando los azucares en casa  - no salome alcohol  - regrezar a vernos en 3 meces

## 2023-03-17 NOTE — PROGRESS NOTES
[facility]    Xiomy Laurent is a 40 y.o. male, here for evaluation of the following concerns:    HPI    Review of Systems    MEDICATIONS:  Prior to Visit Medications    Medication Sig Taking? Authorizing Provider   metFORMIN (GLUCOPHAGE) 1000 MG tablet Take 1 tablet by mouth 2 times daily (with meals)  Mague Arroyo MD   Lancets MISC 1 each by Does not apply route 2 times daily  Mague Arroyo MD   Insulin Pen Needle 31G X 8 MM MISC 1 each by Does not apply route daily  Mague Arroyo MD   insulin glargine [facility]) 100 UNIT/ML injection pen Inject 20 Units into the skin nightly  Mague Arroyo MD   gemfibrozil (LOPID) 600 MG tablet Take 1 tablet by mouth 2 times daily (before meals)  Mague Arroyo MD   blood glucose monitor strips Test 2 times a day & as needed for symptoms of irregular blood glucose. Dispense sufficient amount for indicated testing frequency plus additional to accommodate PRN testing needs. Mague Arroyo MD   ascorbic acid (V-R VITAMIN C) 250 MG tablet Take 1 tablet by mouth daily  Mague Arroyo MD   menthol (ICY HOT ORIGINAL PAIN RELIEF) 2.5 % GEL topical gel Apply topically 2 times daily as needed for Other (pain)  Mague Arroyo MD   Alcohol Swabs PADS 1 applicator by Does not apply route 2 times daily  Graciela Almanza MD   glucose monitoring (FREESTYLE FREEDOM) kit 1 kit by Does not apply route daily  Graciela Almanza MD   ibuprofen (IBU) 800 MG tablet Take 1 tablet by mouth every 6 hours as needed for Pain. MCKENNA Hernandez        There were no vitals filed for this visit. Estimated body mass index is 26.4 kg/m² as calculated from the following:    Height as of 12/16/22: 5' 1\" (1.549 m). Weight as of 3/8/23: 139 lb 11.2 oz (63.4 kg). Physical Exam    ASSESSMENT/PLAN:     {There are no diagnoses linked to this encounter. (Refresh or delete this SmartLink)}    No follow-ups on file.     The patient was staffed with {teaching attendin:::1}. An electronic signature was used to authenticate this note.     --Xavier Ramírez MD

## 2023-03-17 NOTE — PROGRESS NOTES
[facility]    Ignacia Cameron is a 40 y.o. male with PMHx hypertriglyceridemia, EtOH abuse, T2DM here for follow-up of DM:    Patient last seen December 2022. HbA1c 12/16/22 9.6%, patient was instructed to increase his insulin dose to achieve fasting AM glucose <140. Was on 20 U nightly lantus and 1g metoprolol BID at that time. Patient presented to ED on 3/8/23 for head spinning, dizziness, and emesis and home blood glucose 500s. At that time he mentioned to ED staff that he does not take his insulin because it is too expensive (out for 1 month). Glucose in . Patient has been checking blood glucose in AM and afternoon. It was 140-170 in AM when he was taking his insulin, but now 240 in AM . In afternoons blood glucose is 280 -300 around 1 hr after eating. He noted that Sunday (5 days ago), patient had felt itchy and swollen eyes and headache with weak/heavy lower legs. These symptoms lasted around 4 hours. When he checked his blood sugar then it was 270, and so he drank a coca cola with sugar. He felt this helped his symptoms. He notes that he had not been sleeping well at the time. No known sick contacts, fevers, cough, sore throat, chest pain, shortness of breath. Patient also complains of some swelling around his cuticles on a few fingers on both hands, with some pus released with pressure. Also notes some difficulty achieving erections. Feels interested in sex, denies feeling depressed. Denies morning erections. Review of Systems   Constitutional:  Negative for activity change, diaphoresis, fatigue and fever. HENT:  Negative for congestion and sore throat. Respiratory:  Negative for cough, choking, shortness of breath and wheezing. Cardiovascular:  Negative for chest pain. Gastrointestinal:  Negative for abdominal pain, diarrhea and vomiting. Genitourinary:         Erectile dysfunction   Musculoskeletal:  Negative for arthralgias. Neurological:  Negative for dizziness and weakness. Psychiatric/Behavioral:  Negative for agitation, dysphoric mood and suicidal ideas. MEDICATIONS:  Prior to Visit Medications    Medication Sig Taking? Authorizing Provider   metFORMIN (GLUCOPHAGE) 1000 MG tablet Take 1 tablet by mouth 2 times daily (with meals) Yes Lexi Roberson MD   gemfibrozil (LOPID) 600 MG tablet Take 1 tablet by mouth 2 times daily (before meals) Yes Lexi Roberson MD   insulin NPH (HUMULIN N) 100 UNIT/ML injection vial Inject 20 Units into the skin nightly Yes Lexi Roberson MD   nystatin (MYCOSTATIN) 748408 UNIT/GM ointment Apply topically 2 times daily to affected cuticles. Yes Lexi Roberson MD   Lancets MISC 1 each by Does not apply route 2 times daily Yes Lexi Roberson MD   Insulin Pen Needle 31G X 8 MM MISC 1 each by Does not apply route daily Yes Lexi Roberson MD   blood glucose monitor strips Test 2 times a day & as needed for symptoms of irregular blood glucose. Dispense sufficient amount for indicated testing frequency plus additional to accommodate PRN testing needs. Yes Lexi Roberson MD   menthol (ICY HOT ORIGINAL PAIN RELIEF) 2.5 % GEL topical gel Apply topically 2 times daily as needed for Other (pain) Yes Lexi Roberson MD   Alcohol Swabs PADS 1 applicator by Does not apply route 2 times daily Yes Yaquelin Hill MD   glucose monitoring (FREESTYLE FREEDOM) kit 1 kit by Does not apply route daily Yes Yaquelin Hill MD   ibuprofen (IBU) 800 MG tablet Take 1 tablet by mouth every 6 hours as needed for Pain.   MCKENNA Gonzales        Vitals:    03/17/23 1022   BP: 119/80   Site: Left Upper Arm   Position: Sitting   Cuff Size: Medium Adult   Pulse: 82   Resp: 16   Temp: 98.2 °F (36.8 °C)   TempSrc: Temporal   SpO2: 100%   Weight: 138 lb 12.8 oz (63 kg)   Height: 5' 1.5\" (1.562 m)      Estimated body mass index is 25.8 kg/m² as calculated from the following:    Height as of this encounter: 5' 1.5\" (1.562 6 m).    Weight as of this encounter: 138 lb 12.8 oz (63 kg). Physical Exam  Constitutional:       Appearance: Normal appearance. He is normal weight. HENT:      Mouth/Throat:      Mouth: Mucous membranes are moist.      Pharynx: Oropharynx is clear. Cardiovascular:      Rate and Rhythm: Normal rate and regular rhythm. Pulses: Normal pulses. Heart sounds: Normal heart sounds. No murmur heard. Pulmonary:      Effort: Pulmonary effort is normal. No respiratory distress. Breath sounds: Normal breath sounds. No wheezing, rhonchi or rales. Abdominal:      General: There is no distension. Palpations: Abdomen is soft. Tenderness: There is no abdominal tenderness. Musculoskeletal:         General: No swelling or tenderness. Comments: Swelling around cuticle of L thumb and forefinger, R thumb   Skin:     General: Skin is warm and dry. Neurological:      General: No focal deficit present. Mental Status: He is alert and oriented to person, place, and time. Psychiatric:         Mood and Affect: Mood normal.         Behavior: Behavior normal.         Thought Content: Thought content normal.         Judgment: Judgment normal.       ASSESSMENT/PLAN:     1. Type 2 diabetes mellitus with hyperosmolar coma, unspecified whether long term insulin use ([facility]  HbA1c 12/16/22 9.6% at last visit, 11.7% today. Patient has been off insulin x 1 month due to cost of lantus. -     metFORMIN (GLUCOPHAGE) 1000 MG tablet; Take 1 tablet by mouth 2 times daily (with meals), Disp-30 tablet, R-3Normal  -  stop basal insulin, start insulin NPH (HUMULIN N) 100 UNIT/ML injection vial; Inject 20 Units into the skin nightly, Disp-10 mL, R-1Normal  - educated regarding low carb diet    2. Paronychia of finger, unspecified laterality  Bilateral hands, likely fungal  -     nystatin (MYCOSTATIN) [id number] UNIT/GM ointment; Apply topically 2 times daily to affected cuticles. , Disp-30 g, R-0, Normal    3.  Erectile dysfunction, unspecified erectile dysfunction type  Possibly related to uncontrolled DM. - treat DM and see if sx improve  - consider sildenafil at next visit if sx not improved    4. Hypertriglyceridemia  -     gemfibrozil (LOPID) 600 MG tablet; Take 1 tablet by mouth 2 times daily (before meals), Disp-60 tablet, R-3Normal    Return in about 3 months (around 6/17/2023). The patient was staffed with teaching attending: Dr. Jb Mcclendon. An electronic signature was used to authenticate this note.     --Gilman Lefort, MD

## 2023-08-08 DIAGNOSIS — E11.01 TYPE 2 DIABETES MELLITUS WITH HYPEROSMOLAR COMA, UNSPECIFIED WHETHER LONG TERM INSULIN USE (HCC): ICD-10-CM

## 2023-08-08 NOTE — TELEPHONE ENCOUNTER
Requested Prescriptions     Pending Prescriptions Disp Refills    metFORMIN (GLUCOPHAGE) 1000 MG tablet 30 tablet 3     Sig: Take 1 tablet by mouth 2 times daily (with meals)       Last Clinic Visit:  6/16/2023     Next Clinic Appointment:  8/11/2023

## 2023-09-02 ENCOUNTER — HOSPITAL ENCOUNTER (EMERGENCY)
Age: 43
Discharge: HOME | End: 2023-09-02
Attending: EMERGENCY MEDICINE

## 2023-09-02 ENCOUNTER — APPOINTMENT (OUTPATIENT)
Dept: CT IMAGING | Age: 43
End: 2023-09-02

## 2023-09-02 VITALS
RESPIRATION RATE: 20 BRPM | HEART RATE: 93 BPM | DIASTOLIC BLOOD PRESSURE: 81 MMHG | OXYGEN SATURATION: 99 % | TEMPERATURE: 98 F | SYSTOLIC BLOOD PRESSURE: 126 MMHG

## 2023-09-02 DIAGNOSIS — E86.0 DEHYDRATION: ICD-10-CM

## 2023-09-02 DIAGNOSIS — R10.11 ABDOMINAL PAIN, RIGHT UPPER QUADRANT: ICD-10-CM

## 2023-09-02 DIAGNOSIS — Z87.19 HISTORY OF PANCREATITIS: ICD-10-CM

## 2023-09-02 DIAGNOSIS — R11.2 NAUSEA AND VOMITING, UNSPECIFIED VOMITING TYPE: ICD-10-CM

## 2023-09-02 DIAGNOSIS — R10.13 EPIGASTRIC PAIN: Primary | ICD-10-CM

## 2023-09-02 LAB
ALBUMIN SERPL-MCNC: 4.3 G/DL (ref 3.4–5)
ALP SERPL-CCNC: 164 U/L (ref 40–129)
ALT SERPL-CCNC: 23 U/L (ref 10–40)
ANION GAP SERPL CALCULATED.3IONS-SCNC: 19 MMOL/L (ref 3–16)
AST SERPL-CCNC: 46 U/L (ref 15–37)
BACTERIA URNS QL MICRO: ABNORMAL /HPF
BASOPHILS # BLD: 0 K/UL (ref 0–0.2)
BASOPHILS NFR BLD: 0.6 %
BILIRUB DIRECT SERPL-MCNC: 0.3 MG/DL (ref 0–0.3)
BILIRUB INDIRECT SERPL-MCNC: 1.4 MG/DL (ref 0–1)
BILIRUB SERPL-MCNC: 1.7 MG/DL (ref 0–1)
BILIRUB UR QL STRIP.AUTO: NEGATIVE
BUN SERPL-MCNC: 17 MG/DL (ref 7–20)
CALCIUM SERPL-MCNC: 9.7 MG/DL (ref 8.3–10.6)
CHLORIDE SERPL-SCNC: 89 MMOL/L (ref 99–110)
CLARITY UR: CLEAR
CO2 SERPL-SCNC: 23 MMOL/L (ref 21–32)
COLOR UR: ABNORMAL
CREAT SERPL-MCNC: 0.7 MG/DL (ref 0.9–1.3)
DEPRECATED RDW RBC AUTO: 13.1 % (ref 12.4–15.4)
EOSINOPHIL # BLD: 0 K/UL (ref 0–0.6)
EOSINOPHIL NFR BLD: 0.5 %
EPI CELLS #/AREA URNS AUTO: 0 /HPF (ref 0–5)
GFR SERPLBLD CREATININE-BSD FMLA CKD-EPI: >60 ML/MIN/{1.73_M2}
GLUCOSE SERPL-MCNC: 279 MG/DL (ref 70–99)
GLUCOSE UR STRIP.AUTO-MCNC: >=1000 MG/DL
HCT VFR BLD AUTO: 41.9 % (ref 40.5–52.5)
HGB BLD-MCNC: 14.2 G/DL (ref 13.5–17.5)
HGB UR QL STRIP.AUTO: NEGATIVE
HYALINE CASTS #/AREA URNS AUTO: 18 /LPF (ref 0–8)
KETONES UR STRIP.AUTO-MCNC: ABNORMAL MG/DL
LEUKOCYTE ESTERASE UR QL STRIP.AUTO: NEGATIVE
LIPASE SERPL-CCNC: 32 U/L (ref 13–60)
LYMPHOCYTES # BLD: 1.3 K/UL (ref 1–5.1)
LYMPHOCYTES NFR BLD: 17 %
MCH RBC QN AUTO: 31.8 PG (ref 26–34)
MCHC RBC AUTO-ENTMCNC: 34 G/DL (ref 31–36)
MCV RBC AUTO: 93.4 FL (ref 80–100)
MONOCYTES # BLD: 0.4 K/UL (ref 0–1.3)
MONOCYTES NFR BLD: 4.8 %
NEUTROPHILS # BLD: 5.8 K/UL (ref 1.7–7.7)
NEUTROPHILS NFR BLD: 77.1 %
NITRITE UR QL STRIP.AUTO: NEGATIVE
PH UR STRIP.AUTO: 5.5 [PH] (ref 5–8)
PLATELET # BLD AUTO: 338 K/UL (ref 135–450)
PMV BLD AUTO: 7.8 FL (ref 5–10.5)
POTASSIUM SERPL-SCNC: 4.3 MMOL/L (ref 3.5–5.1)
PROT SERPL-MCNC: 7.8 G/DL (ref 6.4–8.2)
PROT UR STRIP.AUTO-MCNC: 100 MG/DL
RBC # BLD AUTO: 4.49 M/UL (ref 4.2–5.9)
RBC CLUMPS #/AREA URNS AUTO: 0 /HPF (ref 0–4)
SODIUM SERPL-SCNC: 131 MMOL/L (ref 136–145)
SP GR UR STRIP.AUTO: 1.02 (ref 1–1.03)
UA COMPLETE W REFLEX CULTURE PNL UR: ABNORMAL
UA DIPSTICK W REFLEX MICRO PNL UR: YES
URN SPEC COLLECT METH UR: ABNORMAL
UROBILINOGEN UR STRIP-ACNC: 1 E.U./DL
WBC # BLD AUTO: 7.6 K/UL (ref 4–11)
WBC #/AREA URNS AUTO: 1 /HPF (ref 0–5)

## 2023-09-02 PROCEDURE — 74177 CT ABD & PELVIS W/CONTRAST: CPT

## 2023-09-02 PROCEDURE — 2580000003 HC RX 258: Performed by: EMERGENCY MEDICINE

## 2023-09-02 PROCEDURE — 80048 BASIC METABOLIC PNL TOTAL CA: CPT

## 2023-09-02 PROCEDURE — 96375 TX/PRO/DX INJ NEW DRUG ADDON: CPT

## 2023-09-02 PROCEDURE — 96374 THER/PROPH/DIAG INJ IV PUSH: CPT

## 2023-09-02 PROCEDURE — 96361 HYDRATE IV INFUSION ADD-ON: CPT

## 2023-09-02 PROCEDURE — 83690 ASSAY OF LIPASE: CPT

## 2023-09-02 PROCEDURE — 80076 HEPATIC FUNCTION PANEL: CPT

## 2023-09-02 PROCEDURE — 6360000002 HC RX W HCPCS: Performed by: EMERGENCY MEDICINE

## 2023-09-02 PROCEDURE — 81001 URINALYSIS AUTO W/SCOPE: CPT

## 2023-09-02 PROCEDURE — 85025 COMPLETE CBC W/AUTO DIFF WBC: CPT

## 2023-09-02 PROCEDURE — 99285 EMERGENCY DEPT VISIT HI MDM: CPT

## 2023-09-02 PROCEDURE — 6360000004 HC RX CONTRAST MEDICATION: Performed by: EMERGENCY MEDICINE

## 2023-09-02 RX ORDER — LIDOCAINE HYDROCHLORIDE 20 MG/ML
15 SOLUTION OROPHARYNGEAL EVERY 4 HOURS PRN
Qty: 1 EACH | Refills: 0 | Status: SHIPPED | OUTPATIENT
Start: 2023-09-02 | End: 2023-09-02 | Stop reason: CLARIF

## 2023-09-02 RX ORDER — ACETAMINOPHEN 325 MG/1
650 TABLET ORAL ONCE
Status: DISCONTINUED | OUTPATIENT
Start: 2023-09-02 | End: 2023-09-02

## 2023-09-02 RX ORDER — AMOXICILLIN 500 MG/1
500 CAPSULE ORAL 3 TIMES DAILY
Qty: 21 CAPSULE | Refills: 0 | Status: SHIPPED | OUTPATIENT
Start: 2023-09-02 | End: 2023-09-02 | Stop reason: CLARIF

## 2023-09-02 RX ORDER — 0.9 % SODIUM CHLORIDE 0.9 %
1000 INTRAVENOUS SOLUTION INTRAVENOUS ONCE
Status: COMPLETED | OUTPATIENT
Start: 2023-09-02 | End: 2023-09-02

## 2023-09-02 RX ORDER — KETOROLAC TROMETHAMINE 30 MG/ML
15 INJECTION, SOLUTION INTRAMUSCULAR; INTRAVENOUS ONCE
Status: COMPLETED | OUTPATIENT
Start: 2023-09-02 | End: 2023-09-02

## 2023-09-02 RX ORDER — CHLORHEXIDINE GLUCONATE 0.12 MG/ML
15 RINSE ORAL 3 TIMES DAILY
Qty: 630 ML | Refills: 0 | Status: SHIPPED | OUTPATIENT
Start: 2023-09-02 | End: 2023-09-02 | Stop reason: CLARIF

## 2023-09-02 RX ORDER — ONDANSETRON 2 MG/ML
8 INJECTION INTRAMUSCULAR; INTRAVENOUS ONCE
Status: COMPLETED | OUTPATIENT
Start: 2023-09-02 | End: 2023-09-02

## 2023-09-02 RX ADMIN — IOPAMIDOL 75 ML: 755 INJECTION, SOLUTION INTRAVENOUS at 09:02

## 2023-09-02 RX ADMIN — ONDANSETRON 8 MG: 2 INJECTION INTRAMUSCULAR; INTRAVENOUS at 08:16

## 2023-09-02 RX ADMIN — SODIUM CHLORIDE 1000 ML: 9 INJECTION, SOLUTION INTRAVENOUS at 10:29

## 2023-09-02 RX ADMIN — KETOROLAC TROMETHAMINE 15 MG: 30 INJECTION, SOLUTION INTRAMUSCULAR; INTRAVENOUS at 08:15

## 2023-09-02 ASSESSMENT — PAIN SCALES - GENERAL: PAINLEVEL_OUTOF10: 8

## 2023-09-02 ASSESSMENT — PAIN DESCRIPTION - LOCATION: LOCATION: ABDOMEN

## 2023-09-02 ASSESSMENT — PAIN - FUNCTIONAL ASSESSMENT: PAIN_FUNCTIONAL_ASSESSMENT: 0-10

## 2023-09-02 NOTE — DISCHARGE INSTRUCTIONS
Your work-up today included blood tests, urine test, and a CT scan of your belly. The results were reassuring overall though did show signs of dehydration. No signs of acute pancreatitis. With a history of pancreatitis you should not drink any alcohol because it is likely to give you a flareup. In the emergency department you had signs of dehydration which make your symptoms feel worse. You received medicine here for pain, nausea, and IV fluids to hydrate you. We are very sorry about the loss of your father. Please follow-up with your primary care as needed. Return to emergency department for any new or worsening symptoms or concerns.

## 2023-09-02 NOTE — ED NOTES
Pt d/c and all question answered. Pt verbalized understanding.    Chris # [id number]      Demond Dallas RN  09/02/23 1727

## 2023-09-02 NOTE — ED PROVIDER NOTES
[facility]        Pt Name: Ramos Butler  MRN: [id number]  [address] 1980  Date of evaluation: 9/2/2023  Provider: Noam Christopher MD  PCP: Greer Raphael MD  Note Started: 7:30 AM EDT 9/2/23    CHIEF COMPLAINT     Too much pain  HISTORY OF PRESENT ILLNESS: 1 or more Elements     Chief Complaint   Patient presents with    Pancreatitis     Pt states he has a hx of pancreatitis. States he drank 8 beers a couple of days ago after this father passed away and has had abdominal pain and vomiting since. History from : Patient  Limitations to history : Language Latvian is primary language    Ramos Butler is a 40 y.o. male who presents to the emergency department secondary to concern for abdominal pain. He states that he has been having pain since yesterday. He reports his father passed away a few days ago and he drank 8-10 beers after which she started having pain. He endorses nausea, vomiting (nonbloody nonbilious. He does have a history of pancreatitis and feels like this is part of the pancreatitis. He states that the pain is located mainly in the upper and right part of his stomach. Rates the pain 8 out of 10. Has not taken any medication for pain prior to coming in. He does also have a history of diabetes, he takes insulin, he has not missed any doses. He reports he urinated earlier today and it kind of burned however he states this happens when he has pancreatitis. No concern for STDs. His last bowel movement was earlier today and normal for him. No fevers or chills. Prior surgical history includes appendectomy. Past medical history noted below. Denies smoking. Aside from what is stated above denies any other symptoms or modifying factors. Nursing Notes were all reviewed and agreed with or any disagreements addressed in HPI/MDM.   REVIEW OF SYSTEMS :    Review of Systems Pertinent positive and negative findings direct bilirubin is within normal limits. So is the lipase though this may be related to history of chronic pancreatitis. Urinalysis without signs of overt infection. CT scan shows stable changes of chronic pancreatitis with unchanged fluid/stranding at the tail of the pancreas. Also shows hepatic steatosis. On reassessment discussed results. At that time he reported he was feeling a lot better. His vitals had significantly improved including his heart rate. He was feeling hungry and so he was given fluids and crackers. Abdomen at that time remains benign. On next reassessment he reported he was still feeling well. He had no return of symptoms. We discussed chronic pancreatitis and reiterated importance of not drinking any alcohol. Discussed follow-up with primary care as well as return precautions. After discussion of results, diagnosis, and symptomatic care, I reiterated return precautions and importance of follow-up. He expressed understanding of all instructions, was in agreement with plan, and all questions were answered. He was discharged home in stable condition. Disposition Considerations (tests considered but not done, Shared Decision Making, Pt Expectation of Test or Tx.): Appropriate for outpatient management      I estimate there is low risk for sepsis, MI, stroke, tamponade, PTX, toxicity, or other life threatening etiology. Given the best available information and clinical assessment I estimate the risk of hospitalization to be greater than risk of treatment at home. We discussed and I explained the risk could rapidly change and return precautions and instructions given. Discharge disposition is reasonable. I am the Primary Clinician of Record. FINAL IMPRESSION      1. Epigastric pain    2. Abdominal pain, right upper quadrant    3. Nausea and vomiting, unspecified vomiting type    4. History of pancreatitis    5.  Dehydration          DISPOSITION/PLAN   DISPOSITION

## 2023-09-20 ENCOUNTER — APPOINTMENT (OUTPATIENT)
Dept: CT IMAGING | Age: 43
End: 2023-09-20

## 2023-09-20 ENCOUNTER — HOSPITAL ENCOUNTER (EMERGENCY)
Age: 43
Discharge: HOME | End: 2023-09-20
Attending: EMERGENCY MEDICINE

## 2023-09-20 VITALS
WEIGHT: 137 LBS | TEMPERATURE: 98.5 F | HEIGHT: 61 IN | OXYGEN SATURATION: 99 % | SYSTOLIC BLOOD PRESSURE: 128 MMHG | HEART RATE: 91 BPM | DIASTOLIC BLOOD PRESSURE: 75 MMHG | RESPIRATION RATE: 12 BRPM | BODY MASS INDEX: 25.86 KG/M2

## 2023-09-20 DIAGNOSIS — E87.1 HYPONATREMIA: ICD-10-CM

## 2023-09-20 DIAGNOSIS — K86.1 CHRONIC PANCREATITIS, UNSPECIFIED PANCREATITIS TYPE (HCC): Primary | ICD-10-CM

## 2023-09-20 LAB
ALBUMIN SERPL-MCNC: 4.6 G/DL (ref 3.4–5)
ALBUMIN/GLOB SERPL: 1.4 {RATIO} (ref 1.1–2.2)
ALP SERPL-CCNC: 160 U/L (ref 40–129)
ALT SERPL-CCNC: <5 U/L (ref 10–40)
ANION GAP SERPL CALCULATED.3IONS-SCNC: 15 MMOL/L (ref 3–16)
AST SERPL-CCNC: 65 U/L (ref 15–37)
BACTERIA URNS QL MICRO: NORMAL /HPF
BASE EXCESS BLDV CALC-SCNC: 11.4 MMOL/L (ref -3–3)
BASOPHILS # BLD: 0 K/UL (ref 0–0.2)
BASOPHILS NFR BLD: 0.6 %
BETA-HYDROXYBUTYRATE: 1.1 MMOL/L (ref 0–0.27)
BILIRUB SERPL-MCNC: 1.4 MG/DL (ref 0–1)
BILIRUB UR QL STRIP.AUTO: NEGATIVE
BUN SERPL-MCNC: 14 MG/DL (ref 7–20)
CALCIUM SERPL-MCNC: 9.3 MG/DL (ref 8.3–10.6)
CHLORIDE SERPL-SCNC: 81 MMOL/L (ref 99–110)
CLARITY UR: CLEAR
CO2 BLDV-SCNC: 85 MMOL/L
CO2 SERPL-SCNC: 30 MMOL/L (ref 21–32)
COHGB MFR BLDV: 2.9 % (ref 0–1.5)
COLOR UR: YELLOW
CREAT SERPL-MCNC: 0.6 MG/DL (ref 0.9–1.3)
DEPRECATED RDW RBC AUTO: 12.9 % (ref 12.4–15.4)
DO-HGB MFR BLDV: 20 %
EKG ATRIAL RATE: 88 BPM
EKG DIAGNOSIS: NORMAL
EKG P AXIS: 59 DEGREES
EKG P-R INTERVAL: 162 MS
EKG Q-T INTERVAL: 364 MS
EKG QRS DURATION: 90 MS
EKG QTC CALCULATION (BAZETT): 440 MS
EKG R AXIS: -20 DEGREES
EKG T AXIS: 53 DEGREES
EKG VENTRICULAR RATE: 88 BPM
EOSINOPHIL # BLD: 0 K/UL (ref 0–0.6)
EOSINOPHIL NFR BLD: 0.6 %
EPI CELLS #/AREA URNS AUTO: 0 /HPF (ref 0–5)
ETHANOLAMINE SERPL-MCNC: NORMAL MG/DL (ref 0–0.08)
GFR SERPLBLD CREATININE-BSD FMLA CKD-EPI: >60 ML/MIN/{1.73_M2}
GLUCOSE SERPL-MCNC: 288 MG/DL (ref 70–99)
GLUCOSE UR STRIP.AUTO-MCNC: 500 MG/DL
HCO3 BLDV-SCNC: 36.6 MMOL/L (ref 23–29)
HCT VFR BLD AUTO: 39.6 % (ref 40.5–52.5)
HGB BLD-MCNC: 13.7 G/DL (ref 13.5–17.5)
HGB UR QL STRIP.AUTO: NEGATIVE
HYALINE CASTS #/AREA URNS AUTO: 0 /LPF (ref 0–8)
KETONES UR STRIP.AUTO-MCNC: 15 MG/DL
LEUKOCYTE ESTERASE UR QL STRIP.AUTO: NEGATIVE
LIPASE SERPL-CCNC: 24 U/L (ref 13–60)
LYMPHOCYTES # BLD: 1.5 K/UL (ref 1–5.1)
LYMPHOCYTES NFR BLD: 28.1 %
MCH RBC QN AUTO: 31.8 PG (ref 26–34)
MCHC RBC AUTO-ENTMCNC: 34.7 G/DL (ref 31–36)
MCV RBC AUTO: 91.7 FL (ref 80–100)
METHGB MFR BLDV: 0.6 %
MONOCYTES # BLD: 0.4 K/UL (ref 0–1.3)
MONOCYTES NFR BLD: 7.4 %
NEUTROPHILS # BLD: 3.3 K/UL (ref 1.7–7.7)
NEUTROPHILS NFR BLD: 63.3 %
NITRITE UR QL STRIP.AUTO: NEGATIVE
O2 CT VFR BLDV CALC: 15 VOL %
O2 THERAPY: ABNORMAL
PCO2 BLDV: 48.4 MMHG (ref 40–50)
PH BLDV: 7.49 [PH] (ref 7.35–7.45)
PH UR STRIP.AUTO: 8 [PH] (ref 5–8)
PLATELET # BLD AUTO: 339 K/UL (ref 135–450)
PMV BLD AUTO: 8 FL (ref 5–10.5)
PO2 BLDV: 43.2 MMHG (ref 25–40)
POTASSIUM SERPL-SCNC: 3.9 MMOL/L (ref 3.5–5.1)
PROT SERPL-MCNC: 7.9 G/DL (ref 6.4–8.2)
PROT UR STRIP.AUTO-MCNC: 30 MG/DL
RBC # BLD AUTO: 4.32 M/UL (ref 4.2–5.9)
RBC CLUMPS #/AREA URNS AUTO: 0 /HPF (ref 0–4)
SAO2 % BLDV: 80 %
SARS-COV-2 RDRP RESP QL NAA+PROBE: NOT DETECTED
SODIUM SERPL-SCNC: 126 MMOL/L (ref 136–145)
SP GR UR STRIP.AUTO: 1.02 (ref 1–1.03)
TROPONIN, HIGH SENSITIVITY: 8 NG/L (ref 0–22)
TROPONIN, HIGH SENSITIVITY: 8 NG/L (ref 0–22)
UA COMPLETE W REFLEX CULTURE PNL UR: ABNORMAL
UA DIPSTICK W REFLEX MICRO PNL UR: YES
URN SPEC COLLECT METH UR: ABNORMAL
UROBILINOGEN UR STRIP-ACNC: 1 E.U./DL
WBC # BLD AUTO: 5.3 K/UL (ref 4–11)
WBC #/AREA URNS AUTO: 1 /HPF (ref 0–5)

## 2023-09-20 PROCEDURE — 83690 ASSAY OF LIPASE: CPT

## 2023-09-20 PROCEDURE — 6360000002 HC RX W HCPCS: Performed by: EMERGENCY MEDICINE

## 2023-09-20 PROCEDURE — 82803 BLOOD GASES ANY COMBINATION: CPT

## 2023-09-20 PROCEDURE — 85025 COMPLETE CBC W/AUTO DIFF WBC: CPT

## 2023-09-20 PROCEDURE — 2580000003 HC RX 258: Performed by: EMERGENCY MEDICINE

## 2023-09-20 PROCEDURE — 74177 CT ABD & PELVIS W/CONTRAST: CPT

## 2023-09-20 PROCEDURE — 96361 HYDRATE IV INFUSION ADD-ON: CPT

## 2023-09-20 PROCEDURE — 84484 ASSAY OF TROPONIN QUANT: CPT

## 2023-09-20 PROCEDURE — 80053 COMPREHEN METABOLIC PANEL: CPT

## 2023-09-20 PROCEDURE — 96374 THER/PROPH/DIAG INJ IV PUSH: CPT

## 2023-09-20 PROCEDURE — 93005 ELECTROCARDIOGRAM TRACING: CPT | Performed by: EMERGENCY MEDICINE

## 2023-09-20 PROCEDURE — 82077 ASSAY SPEC XCP UR&BREATH IA: CPT

## 2023-09-20 PROCEDURE — 87635 SARS-COV-2 COVID-19 AMP PRB: CPT

## 2023-09-20 PROCEDURE — 99285 EMERGENCY DEPT VISIT HI MDM: CPT

## 2023-09-20 PROCEDURE — 81001 URINALYSIS AUTO W/SCOPE: CPT

## 2023-09-20 PROCEDURE — 93010 ELECTROCARDIOGRAM REPORT: CPT | Performed by: INTERNAL MEDICINE

## 2023-09-20 PROCEDURE — 82010 KETONE BODYS QUAN: CPT

## 2023-09-20 PROCEDURE — 96375 TX/PRO/DX INJ NEW DRUG ADDON: CPT

## 2023-09-20 PROCEDURE — 6360000004 HC RX CONTRAST MEDICATION: Performed by: EMERGENCY MEDICINE

## 2023-09-20 RX ORDER — HYDROCODONE BITARTRATE AND ACETAMINOPHEN 5; 325 MG/1; MG/1
1 TABLET ORAL EVERY 8 HOURS PRN
Qty: 8 TABLET | Refills: 0 | Status: SHIPPED | OUTPATIENT
Start: 2023-09-20 | End: 2023-09-23

## 2023-09-20 RX ORDER — ONDANSETRON 2 MG/ML
4 INJECTION INTRAMUSCULAR; INTRAVENOUS ONCE
Status: COMPLETED | OUTPATIENT
Start: 2023-09-20 | End: 2023-09-20

## 2023-09-20 RX ORDER — 0.9 % SODIUM CHLORIDE 0.9 %
1000 INTRAVENOUS SOLUTION INTRAVENOUS ONCE
Status: COMPLETED | OUTPATIENT
Start: 2023-09-20 | End: 2023-09-20

## 2023-09-20 RX ORDER — ONDANSETRON 4 MG/1
4 TABLET, ORALLY DISINTEGRATING ORAL 3 TIMES DAILY PRN
Qty: 20 TABLET | Refills: 0 | Status: SHIPPED | OUTPATIENT
Start: 2023-09-20

## 2023-09-20 RX ORDER — MORPHINE SULFATE 4 MG/ML
4 INJECTION, SOLUTION INTRAMUSCULAR; INTRAVENOUS ONCE
Status: COMPLETED | OUTPATIENT
Start: 2023-09-20 | End: 2023-09-20

## 2023-09-20 RX ADMIN — IOPAMIDOL 75 ML: 755 INJECTION, SOLUTION INTRAVENOUS at 08:36

## 2023-09-20 RX ADMIN — SODIUM CHLORIDE 1000 ML: 9 INJECTION, SOLUTION INTRAVENOUS at 08:25

## 2023-09-20 RX ADMIN — MORPHINE SULFATE 4 MG: 4 INJECTION, SOLUTION INTRAMUSCULAR; INTRAVENOUS at 08:26

## 2023-09-20 RX ADMIN — ONDANSETRON 4 MG: 2 INJECTION INTRAMUSCULAR; INTRAVENOUS at 08:26

## 2023-09-20 ASSESSMENT — PAIN SCALES - GENERAL
PAINLEVEL_OUTOF10: 8
PAINLEVEL_OUTOF10: 8

## 2023-09-20 ASSESSMENT — PAIN DESCRIPTION - LOCATION: LOCATION: ABDOMEN

## 2023-09-20 ASSESSMENT — PAIN DESCRIPTION - DESCRIPTORS: DESCRIPTORS: ACHING

## 2023-09-20 ASSESSMENT — PAIN - FUNCTIONAL ASSESSMENT: PAIN_FUNCTIONAL_ASSESSMENT: 0-10

## 2023-09-20 NOTE — ED NOTES
PT tolerated PO without exacerbation in nausea/vomiting. PT reports resolution in abdominal pain.       Jaycee Cuba RN  09/20/23 8480
no difficulties

## 2023-10-02 ENCOUNTER — OFFICE VISIT (OUTPATIENT)
Dept: INTERNAL MEDICINE CLINIC | Age: 43
End: 2023-10-02

## 2023-10-02 VITALS
SYSTOLIC BLOOD PRESSURE: 110 MMHG | HEART RATE: 73 BPM | DIASTOLIC BLOOD PRESSURE: 71 MMHG | TEMPERATURE: 97.3 F | BODY MASS INDEX: 24.09 KG/M2 | WEIGHT: 127.6 LBS | OXYGEN SATURATION: 100 % | HEIGHT: 61 IN

## 2023-10-02 DIAGNOSIS — Z79.4 TYPE 2 DIABETES MELLITUS WITH HYPERGLYCEMIA, WITH LONG-TERM CURRENT USE OF INSULIN (HCC): ICD-10-CM

## 2023-10-02 DIAGNOSIS — K86.0 ALCOHOL-INDUCED CHRONIC PANCREATITIS (HCC): Primary | ICD-10-CM

## 2023-10-02 DIAGNOSIS — Z00.00 HEALTHCARE MAINTENANCE: ICD-10-CM

## 2023-10-02 DIAGNOSIS — E11.65 TYPE 2 DIABETES MELLITUS WITH HYPERGLYCEMIA, WITH LONG-TERM CURRENT USE OF INSULIN (HCC): ICD-10-CM

## 2023-10-02 DIAGNOSIS — T14.8XXA MUSCLE STRAIN: ICD-10-CM

## 2023-10-02 DIAGNOSIS — Z23 NEED FOR VACCINATION FOR VIRAL HEPATITIS: ICD-10-CM

## 2023-10-02 DIAGNOSIS — K76.0 FATTY LIVER: ICD-10-CM

## 2023-10-02 DIAGNOSIS — E11.01 TYPE 2 DIABETES MELLITUS WITH HYPEROSMOLAR COMA, UNSPECIFIED WHETHER LONG TERM INSULIN USE (HCC): ICD-10-CM

## 2023-10-02 DIAGNOSIS — E87.1 HYPONATREMIA: ICD-10-CM

## 2023-10-02 PROBLEM — K86.1 CHRONIC PANCREATITIS (HCC): Status: ACTIVE | Noted: 2023-10-02

## 2023-10-02 LAB
GLUCOSE BLD-MCNC: 551 MG/DL (ref 70–99)
HBA1C MFR BLD: 12.6 %
PERFORMED ON: ABNORMAL

## 2023-10-02 PROCEDURE — 6360000002 HC RX W HCPCS: Performed by: INTERNAL MEDICINE

## 2023-10-02 PROCEDURE — 90740 HEPB VACC 3 DOSE IMMUNSUP IM: CPT | Performed by: INTERNAL MEDICINE

## 2023-10-02 PROCEDURE — 83036 HEMOGLOBIN GLYCOSYLATED A1C: CPT

## 2023-10-02 PROCEDURE — 99213 OFFICE O/P EST LOW 20 MIN: CPT | Performed by: INTERNAL MEDICINE

## 2023-10-02 PROCEDURE — G0010 ADMIN HEPATITIS B VACCINE: HCPCS | Performed by: INTERNAL MEDICINE

## 2023-10-02 RX ORDER — INSULIN GLARGINE 100 [IU]/ML
20 INJECTION, SOLUTION SUBCUTANEOUS NIGHTLY
Qty: 5 ADJUSTABLE DOSE PRE-FILLED PEN SYRINGE | Refills: 4 | Status: SHIPPED | OUTPATIENT
Start: 2023-10-02 | End: 2023-10-02

## 2023-10-02 RX ORDER — IBUPROFEN 800 MG/1
800 TABLET ORAL EVERY 6 HOURS PRN
Qty: 120 TABLET | Refills: 0 | Status: SHIPPED | OUTPATIENT
Start: 2023-10-02

## 2023-10-02 RX ORDER — INSULIN GLARGINE 100 [IU]/ML
40 INJECTION, SOLUTION SUBCUTANEOUS NIGHTLY
Qty: 5 ADJUSTABLE DOSE PRE-FILLED PEN SYRINGE | Refills: 4 | Status: SHIPPED | OUTPATIENT
Start: 2023-10-02

## 2023-10-02 RX ADMIN — HEPATITIS B VACCINE (RECOMBINANT) 20 MCG: 20 INJECTION, SUSPENSION INTRAMUSCULAR at 09:55

## 2023-10-02 ASSESSMENT — ENCOUNTER SYMPTOMS
NAUSEA: 0
CHEST TIGHTNESS: 0
RHINORRHEA: 0
PHOTOPHOBIA: 0
VOMITING: 0
COUGH: 0
BLOOD IN STOOL: 0
COLOR CHANGE: 0
CONSTIPATION: 0
ABDOMINAL PAIN: 0
SHORTNESS OF BREATH: 0
ABDOMINAL DISTENTION: 0
DIARRHEA: 0
BACK PAIN: 0

## 2023-10-02 NOTE — ASSESSMENT & PLAN NOTE
Asymptomatic, lifestyle modifications recommended and screen for HCV and vaccinate against HBV, dose 1 of 3

## 2023-10-02 NOTE — PROGRESS NOTES
[facility]    Sandra Hansen is a 40 y.o. male, here for evaluation of the following concerns:    A [place] citizen  was used for the duration of this encounter Dory Alfonso #[id number]. Chronic pancreatitis  Patient was last seen in the ED on 9/20/23 due to apigastric pain and was found on abdominal CT to have changes associated with chronic pancreatitis with pancreatic calcifications and a chronically dilated 6mm pancreatic duct in the pancreatic neck, body and tail, and sparing the head, increased from prior study in October of 2022. He denied using alcohol at his last ED visit, and he was discharged after his pain subsided and he was able to tolerate fluids. Prior to that, he presented to the ED on 9/2/23 for epigastric pain and N/V after drinking 8-10 beers after his father passed away. He has a history of hypertriglyceridemia and EtOH abuse. He has had this problem since about 3-3.5 years when he was first hospitalized in [place] for 15 days. He does not currently drink alcohol. He denies smoking. State she eats a diet that is fairly balanced with beans, rice, vegetables, chicken broth, a little bread. States he has an active working life that includes a lot of walking. Fatty liver  The CT scan from his last ED visit also shows changes associated with fatty liver. Enforced dietary and lifestyle changes. DM type 2  Last HbA1c 12. 3. He is on Insulin 20 units nightly with a sliding scale. He also is prescribed Metformin. BG in the 200's-300's nightly. As high as 495 this morning. He states that his current insulin regimen is not working. He will take up to 30 units at a time and his blood sugar will still be in the 200's. He states that his prior medication, Ge Loots, which was expensive for him to pay out-of-pocket, worked so much better that he would like to resume this medication despite its cost.    Hyponatremia  Noted on his last hospital visit.  Na

## 2023-10-02 NOTE — ASSESSMENT & PLAN NOTE
Borderline controlled, changes made today: Renewed ibuprofen; made referral to PT, though unsure if pt will follow-up with this since he is self-pay

## 2023-10-03 DIAGNOSIS — Z00.00 HEALTHCARE MAINTENANCE: ICD-10-CM

## 2023-10-03 DIAGNOSIS — E87.1 HYPONATREMIA: ICD-10-CM

## 2023-10-03 DIAGNOSIS — K76.0 FATTY LIVER: ICD-10-CM

## 2023-10-03 DIAGNOSIS — E11.01 TYPE 2 DIABETES MELLITUS WITH HYPEROSMOLAR COMA, UNSPECIFIED WHETHER LONG TERM INSULIN USE (HCC): ICD-10-CM

## 2023-11-01 PROBLEM — Z00.00 HEALTHCARE MAINTENANCE: Status: RESOLVED | Noted: 2023-10-02 | Resolved: 2023-11-01

## 2024-04-09 ENCOUNTER — HOSPITAL ENCOUNTER (EMERGENCY)
Age: 44
Discharge: HOME | End: 2024-04-09

## 2024-04-09 ENCOUNTER — APPOINTMENT (OUTPATIENT)
Dept: CT IMAGING | Age: 44
End: 2024-04-09

## 2024-04-09 VITALS
RESPIRATION RATE: 18 BRPM | WEIGHT: 138 LBS | DIASTOLIC BLOOD PRESSURE: 86 MMHG | HEIGHT: 65 IN | TEMPERATURE: 99.3 F | OXYGEN SATURATION: 100 % | BODY MASS INDEX: 22.99 KG/M2 | SYSTOLIC BLOOD PRESSURE: 129 MMHG | HEART RATE: 100 BPM

## 2024-04-09 DIAGNOSIS — K80.20 GALL STONES: Primary | ICD-10-CM

## 2024-04-09 DIAGNOSIS — R73.9 HYPERGLYCEMIA: ICD-10-CM

## 2024-04-09 LAB
ALBUMIN SERPL-MCNC: 4.6 G/DL (ref 3.4–5)
ALBUMIN/GLOB SERPL: 1.2 {RATIO} (ref 1.1–2.2)
ALP SERPL-CCNC: 130 U/L (ref 40–129)
ALT SERPL-CCNC: 17 U/L (ref 10–40)
ANION GAP SERPL CALCULATED.3IONS-SCNC: 19 MMOL/L (ref 3–16)
AST SERPL-CCNC: 34 U/L (ref 15–37)
BACTERIA URNS QL MICRO: NORMAL /HPF
BASE EXCESS BLDV CALC-SCNC: 5.7 MMOL/L (ref -3–3)
BASOPHILS # BLD: 0.1 K/UL (ref 0–0.2)
BASOPHILS NFR BLD: 1.1 %
BETA-HYDROXYBUTYRATE: 0.2 MMOL/L (ref 0–0.27)
BILIRUB SERPL-MCNC: 0.9 MG/DL (ref 0–1)
BILIRUB UR QL STRIP.AUTO: NEGATIVE
BUN SERPL-MCNC: 17 MG/DL (ref 7–20)
CALCIUM SERPL-MCNC: 9.4 MG/DL (ref 8.3–10.6)
CHLORIDE SERPL-SCNC: 89 MMOL/L (ref 99–110)
CLARITY UR: CLEAR
CO2 BLDV-SCNC: 73 MMOL/L
CO2 SERPL-SCNC: 26 MMOL/L (ref 21–32)
COHGB MFR BLDV: 2.4 % (ref 0–1.5)
COLOR UR: YELLOW
CREAT SERPL-MCNC: 0.9 MG/DL (ref 0.9–1.3)
DEPRECATED RDW RBC AUTO: 14.4 % (ref 12.4–15.4)
DO-HGB MFR BLDV: 22 %
EOSINOPHIL # BLD: 0 K/UL (ref 0–0.6)
EOSINOPHIL NFR BLD: 0.3 %
EPI CELLS #/AREA URNS AUTO: 1 /HPF (ref 0–5)
GFR SERPLBLD CREATININE-BSD FMLA CKD-EPI: >90 ML/MIN/{1.73_M2}
GLUCOSE BLD-MCNC: 261 MG/DL (ref 70–99)
GLUCOSE SERPL-MCNC: 255 MG/DL (ref 70–99)
GLUCOSE UR STRIP.AUTO-MCNC: >=1000 MG/DL
HCO3 BLDV-SCNC: 31.1 MMOL/L (ref 23–29)
HCT VFR BLD AUTO: 39 % (ref 40.5–52.5)
HGB BLD-MCNC: 13.7 G/DL (ref 13.5–17.5)
HGB UR QL STRIP.AUTO: NEGATIVE
HYALINE CASTS #/AREA URNS AUTO: 7 /LPF (ref 0–8)
KETONES UR STRIP.AUTO-MCNC: NEGATIVE MG/DL
LEUKOCYTE ESTERASE UR QL STRIP.AUTO: NEGATIVE
LIPASE SERPL-CCNC: 12 U/L (ref 13–60)
LYMPHOCYTES # BLD: 1.5 K/UL (ref 1–5.1)
LYMPHOCYTES NFR BLD: 21 %
MCH RBC QN AUTO: 31.9 PG (ref 26–34)
MCHC RBC AUTO-ENTMCNC: 35.2 G/DL (ref 31–36)
MCV RBC AUTO: 90.6 FL (ref 80–100)
METHGB MFR BLDV: 1 %
MONOCYTES # BLD: 0.7 K/UL (ref 0–1.3)
MONOCYTES NFR BLD: 8.9 %
NEUTROPHILS # BLD: 5 K/UL (ref 1.7–7.7)
NEUTROPHILS NFR BLD: 68.7 %
NITRITE UR QL STRIP.AUTO: NEGATIVE
O2 CT VFR BLDV CALC: 15 VOL %
O2 THERAPY: ABNORMAL
PCO2 BLDV: 47.3 MMHG (ref 40–50)
PERFORMED ON: ABNORMAL
PH BLDV: 7.43 [PH] (ref 7.35–7.45)
PH UR STRIP.AUTO: 6.5 [PH] (ref 5–8)
PLATELET # BLD AUTO: 344 K/UL (ref 135–450)
PMV BLD AUTO: 7.1 FL (ref 5–10.5)
PO2 BLDV: 43.6 MMHG (ref 25–40)
POTASSIUM SERPL-SCNC: 4.4 MMOL/L (ref 3.5–5.1)
PROT SERPL-MCNC: 8.5 G/DL (ref 6.4–8.2)
PROT UR STRIP.AUTO-MCNC: 30 MG/DL
RBC # BLD AUTO: 4.31 M/UL (ref 4.2–5.9)
RBC CLUMPS #/AREA URNS AUTO: 0 /HPF (ref 0–4)
SAO2 % BLDV: 78 %
SODIUM SERPL-SCNC: 134 MMOL/L (ref 136–145)
SP GR UR STRIP.AUTO: 1.02 (ref 1–1.03)
UA COMPLETE W REFLEX CULTURE PNL UR: ABNORMAL
UA DIPSTICK W REFLEX MICRO PNL UR: YES
URN SPEC COLLECT METH UR: ABNORMAL
UROBILINOGEN UR STRIP-ACNC: 1 E.U./DL
WBC # BLD AUTO: 7.3 K/UL (ref 4–11)
WBC #/AREA URNS AUTO: 1 /HPF (ref 0–5)

## 2024-04-09 PROCEDURE — 82803 BLOOD GASES ANY COMBINATION: CPT

## 2024-04-09 PROCEDURE — 99285 EMERGENCY DEPT VISIT HI MDM: CPT

## 2024-04-09 PROCEDURE — 2580000003 HC RX 258: Performed by: PHYSICIAN ASSISTANT

## 2024-04-09 PROCEDURE — 80053 COMPREHEN METABOLIC PANEL: CPT

## 2024-04-09 PROCEDURE — 96374 THER/PROPH/DIAG INJ IV PUSH: CPT

## 2024-04-09 PROCEDURE — 83690 ASSAY OF LIPASE: CPT

## 2024-04-09 PROCEDURE — 81001 URINALYSIS AUTO W/SCOPE: CPT

## 2024-04-09 PROCEDURE — 96375 TX/PRO/DX INJ NEW DRUG ADDON: CPT

## 2024-04-09 PROCEDURE — 82010 KETONE BODYS QUAN: CPT

## 2024-04-09 PROCEDURE — 6360000004 HC RX CONTRAST MEDICATION: Performed by: PHYSICIAN ASSISTANT

## 2024-04-09 PROCEDURE — 85025 COMPLETE CBC W/AUTO DIFF WBC: CPT

## 2024-04-09 PROCEDURE — 74177 CT ABD & PELVIS W/CONTRAST: CPT

## 2024-04-09 PROCEDURE — 6360000002 HC RX W HCPCS: Performed by: PHYSICIAN ASSISTANT

## 2024-04-09 RX ORDER — MORPHINE SULFATE 4 MG/ML
4 INJECTION, SOLUTION INTRAMUSCULAR; INTRAVENOUS ONCE
Status: COMPLETED | OUTPATIENT
Start: 2024-04-09 | End: 2024-04-09

## 2024-04-09 RX ORDER — ONDANSETRON 2 MG/ML
4 INJECTION INTRAMUSCULAR; INTRAVENOUS ONCE
Status: COMPLETED | OUTPATIENT
Start: 2024-04-09 | End: 2024-04-09

## 2024-04-09 RX ORDER — HYDROCODONE BITARTRATE AND ACETAMINOPHEN 5; 325 MG/1; MG/1
1 TABLET ORAL EVERY 6 HOURS PRN
Qty: 6 TABLET | Refills: 0 | Status: SHIPPED | OUTPATIENT
Start: 2024-04-09 | End: 2024-04-12

## 2024-04-09 RX ORDER — ONDANSETRON 4 MG/1
4-8 TABLET, ORALLY DISINTEGRATING ORAL EVERY 12 HOURS PRN
Qty: 20 TABLET | Refills: 0 | Status: SHIPPED | OUTPATIENT
Start: 2024-04-09

## 2024-04-09 RX ORDER — 0.9 % SODIUM CHLORIDE 0.9 %
1000 INTRAVENOUS SOLUTION INTRAVENOUS ONCE
Status: COMPLETED | OUTPATIENT
Start: 2024-04-09 | End: 2024-04-09

## 2024-04-09 RX ADMIN — SODIUM CHLORIDE 1000 ML: 9 INJECTION, SOLUTION INTRAVENOUS at 20:31

## 2024-04-09 RX ADMIN — MORPHINE SULFATE 4 MG: 4 INJECTION, SOLUTION INTRAMUSCULAR; INTRAVENOUS at 21:17

## 2024-04-09 RX ADMIN — IOPAMIDOL 75 ML: 755 INJECTION, SOLUTION INTRAVENOUS at 18:59

## 2024-04-09 RX ADMIN — ONDANSETRON 4 MG: 2 INJECTION INTRAMUSCULAR; INTRAVENOUS at 21:16

## 2024-04-09 ASSESSMENT — PAIN DESCRIPTION - LOCATION: LOCATION: ABDOMEN

## 2024-04-09 ASSESSMENT — ENCOUNTER SYMPTOMS
NAUSEA: 1
WHEEZING: 0
VOMITING: 0
COUGH: 0
ABDOMINAL PAIN: 1
RHINORRHEA: 0
SHORTNESS OF BREATH: 0
DIARRHEA: 0

## 2024-04-09 ASSESSMENT — PAIN SCALES - GENERAL
PAINLEVEL_OUTOF10: 8
PAINLEVEL_OUTOF10: 8

## 2024-04-09 ASSESSMENT — PAIN - FUNCTIONAL ASSESSMENT: PAIN_FUNCTIONAL_ASSESSMENT: 0-10

## 2024-04-09 ASSESSMENT — PAIN DESCRIPTION - ORIENTATION: ORIENTATION: MID

## 2024-04-10 NOTE — ED PROVIDER NOTES
[facility] EMERGENCY DEPARTMENT  EMERGENCY DEPARTMENT ENCOUNTER        Pt Name: Dagoberto Kumar  MRN: [id number]  Birthdate 1980  Date of evaluation: 4/9/2024  Provider: Sury Larkin PA-C  PCP: Chris Justice MD  Note Started: 8:58 PM EDT 4/9/24      LLOYD. I have evaluated this patient.        CHIEF COMPLAINT       Chief Complaint   Patient presents with    Abdominal Pain     Patient triaged using , states his blood sugar has been high and he \"has a pain in the bottom of my stomach\", states blood sugar at home was 400.        HISTORY OF PRESENT ILLNESS: 1 or more Elements     History From: patient   Limitations to history : Language Divehi     Dagoberto Kumar is a 43 y.o. male who presents for elevated blood sugar and abdominal pain.  Patient states that his sugar at home was 400.  He reports medication compliance with his insulin.  No fevers or chills.  He has had some nausea.  No vomiting or diarrhea.  No bloody stools.  History of pancreatitis, unsure whether or not it feels the same.  History of appendectomy.  No other abdominal surgeries.  He has no other complaints or concerns at this time.    Nursing Notes were all reviewed and agreed with or any disagreements were addressed in the HPI.    REVIEW OF SYSTEMS :      Review of Systems   Constitutional:  Negative for appetite change, chills and fever.   HENT:  Negative for congestion and rhinorrhea.    Respiratory:  Negative for cough, shortness of breath and wheezing.    Cardiovascular:  Negative for chest pain.   Gastrointestinal:  Positive for abdominal pain and nausea. Negative for diarrhea and vomiting.   Genitourinary:  Negative for difficulty urinating, dysuria and hematuria.   Musculoskeletal:  Negative for neck pain and neck stiffness.   Skin:  Negative for rash.   Neurological:  Negative for headaches.       Positives and Pertinent negatives as per HPI.     SURGICAL HISTORY     Past Surgical History:

## 2024-04-25 NOTE — CARE COORDINATION
Discharge Planning:     (CM) reviewed the patient's chart which states the patient has no health insurance. For continuity of care, patient was scheduled for the following follow-up appointment:    The [facility]  [address] [place] [address], [address] [place] [address]  Phone: [phone]  Appointment: July 28th at 1:15pm with Dr. Alison Jimenez    CM informed patient via  #[id number] Isaac Varela of the above appointment and patient agreed to this plan of action. CM discussed with patient the consult placed for consideration of rehab and provided [place] resource for [employer]. Patient reported that he will be returning home upon discharge where he lives with his family and that he will be transporting himself home, as his car is in the parking lot. All CM discharge needs met at this time. Medical staff to inform CM team if additional discharge needs arise.          Kobe CHAND, LAMONTE, [facility] -   [phone]    Electronically signed by JAGRUTI Mott on 7/21/2022 at 11:24 AM
Yes

## 2024-05-21 ENCOUNTER — APPOINTMENT (OUTPATIENT)
Dept: CT IMAGING | Age: 44
End: 2024-05-21

## 2024-05-21 ENCOUNTER — HOSPITAL ENCOUNTER (EMERGENCY)
Age: 44
Discharge: HOME | End: 2024-05-21
Attending: STUDENT IN AN ORGANIZED HEALTH CARE EDUCATION/TRAINING PROGRAM

## 2024-05-21 VITALS
HEIGHT: 64 IN | WEIGHT: 121 LBS | DIASTOLIC BLOOD PRESSURE: 70 MMHG | SYSTOLIC BLOOD PRESSURE: 101 MMHG | BODY MASS INDEX: 20.66 KG/M2 | RESPIRATION RATE: 16 BRPM | OXYGEN SATURATION: 100 % | HEART RATE: 76 BPM | TEMPERATURE: 98.8 F

## 2024-05-21 DIAGNOSIS — R11.2 NAUSEA AND VOMITING, UNSPECIFIED VOMITING TYPE: ICD-10-CM

## 2024-05-21 DIAGNOSIS — R10.13 ABDOMINAL PAIN, EPIGASTRIC: Primary | ICD-10-CM

## 2024-05-21 DIAGNOSIS — R73.9 HYPERGLYCEMIA: ICD-10-CM

## 2024-05-21 LAB
ALBUMIN SERPL-MCNC: 4.5 G/DL (ref 3.4–5)
ALBUMIN/GLOB SERPL: 1.4 {RATIO} (ref 1.1–2.2)
ALP SERPL-CCNC: 118 U/L (ref 40–129)
ALT SERPL-CCNC: 16 U/L (ref 10–40)
ANION GAP SERPL CALCULATED.3IONS-SCNC: 18 MMOL/L (ref 3–16)
ANION GAP SERPL CALCULATED.3IONS-SCNC: 20 MMOL/L (ref 3–16)
AST SERPL-CCNC: 35 U/L (ref 15–37)
BASE EXCESS BLDV CALC-SCNC: 5.3 MMOL/L (ref -3–3)
BASOPHILS # BLD: 0 K/UL (ref 0–0.2)
BASOPHILS NFR BLD: 0.4 %
BETA-HYDROXYBUTYRATE: 0.2 MMOL/L (ref 0–0.27)
BILIRUB SERPL-MCNC: 1.2 MG/DL (ref 0–1)
BILIRUB UR QL STRIP.AUTO: NEGATIVE
BUN SERPL-MCNC: 12 MG/DL (ref 7–20)
BUN SERPL-MCNC: 15 MG/DL (ref 7–20)
CALCIUM SERPL-MCNC: 7.7 MG/DL (ref 8.3–10.6)
CALCIUM SERPL-MCNC: 8.6 MG/DL (ref 8.3–10.6)
CHLORIDE SERPL-SCNC: 78 MMOL/L (ref 99–110)
CHLORIDE SERPL-SCNC: 90 MMOL/L (ref 99–110)
CLARITY UR: CLEAR
CO2 BLDV-SCNC: 70 MMOL/L
CO2 SERPL-SCNC: 23 MMOL/L (ref 21–32)
CO2 SERPL-SCNC: 23 MMOL/L (ref 21–32)
COHGB MFR BLDV: 2.5 % (ref 0–1.5)
COLOR UR: YELLOW
CREAT SERPL-MCNC: 0.6 MG/DL (ref 0.9–1.3)
CREAT SERPL-MCNC: 0.6 MG/DL (ref 0.9–1.3)
DEPRECATED RDW RBC AUTO: 13.7 % (ref 12.4–15.4)
DO-HGB MFR BLDV: 5 %
EOSINOPHIL # BLD: 0 K/UL (ref 0–0.6)
EOSINOPHIL NFR BLD: 0.5 %
GFR SERPLBLD CREATININE-BSD FMLA CKD-EPI: >90 ML/MIN/{1.73_M2}
GFR SERPLBLD CREATININE-BSD FMLA CKD-EPI: >90 ML/MIN/{1.73_M2}
GLUCOSE BLD-MCNC: 150 MG/DL (ref 70–99)
GLUCOSE BLD-MCNC: 392 MG/DL (ref 70–99)
GLUCOSE SERPL-MCNC: 296 MG/DL (ref 70–99)
GLUCOSE SERPL-MCNC: 458 MG/DL (ref 70–99)
GLUCOSE UR STRIP.AUTO-MCNC: >=1000 MG/DL
HCO3 BLDV-SCNC: 29.9 MMOL/L (ref 23–29)
HCT VFR BLD AUTO: 34.7 % (ref 40.5–52.5)
HGB BLD-MCNC: 12.5 G/DL (ref 13.5–17.5)
HGB UR QL STRIP.AUTO: NEGATIVE
KETONES UR STRIP.AUTO-MCNC: NEGATIVE MG/DL
LEUKOCYTE ESTERASE UR QL STRIP.AUTO: NEGATIVE
LIPASE SERPL-CCNC: 20 U/L (ref 13–60)
LYMPHOCYTES # BLD: 1.6 K/UL (ref 1–5.1)
LYMPHOCYTES NFR BLD: 22.7 %
MAGNESIUM SERPL-MCNC: 2.1 MG/DL (ref 1.8–2.4)
MCH RBC QN AUTO: 33.1 PG (ref 26–34)
MCHC RBC AUTO-ENTMCNC: 36 G/DL (ref 31–36)
MCV RBC AUTO: 92.1 FL (ref 80–100)
METHGB MFR BLDV: 0.3 %
MONOCYTES # BLD: 0.5 K/UL (ref 0–1.3)
MONOCYTES NFR BLD: 7.9 %
NEUTROPHILS # BLD: 4.7 K/UL (ref 1.7–7.7)
NEUTROPHILS NFR BLD: 68.5 %
NITRITE UR QL STRIP.AUTO: NEGATIVE
O2 CT VFR BLDV CALC: 16 VOL %
O2 THERAPY: ABNORMAL
PCO2 BLDV: 42.8 MMHG (ref 40–50)
PERFORMED ON: ABNORMAL
PERFORMED ON: ABNORMAL
PH BLDV: 7.45 [PH] (ref 7.35–7.45)
PH UR STRIP.AUTO: 7 [PH] (ref 5–8)
PLATELET # BLD AUTO: 320 K/UL (ref 135–450)
PMV BLD AUTO: 7.5 FL (ref 5–10.5)
PO2 BLDV: 72.9 MMHG (ref 25–40)
POTASSIUM SERPL-SCNC: 3.1 MMOL/L (ref 3.5–5.1)
POTASSIUM SERPL-SCNC: 3.6 MMOL/L (ref 3.5–5.1)
PROT SERPL-MCNC: 7.8 G/DL (ref 6.4–8.2)
PROT UR STRIP.AUTO-MCNC: NEGATIVE MG/DL
RBC # BLD AUTO: 3.77 M/UL (ref 4.2–5.9)
SAO2 % BLDV: 94 %
SODIUM SERPL-SCNC: 121 MMOL/L (ref 136–145)
SODIUM SERPL-SCNC: 131 MMOL/L (ref 136–145)
SP GR UR STRIP.AUTO: 1.01 (ref 1–1.03)
UA COMPLETE W REFLEX CULTURE PNL UR: ABNORMAL
UA DIPSTICK W REFLEX MICRO PNL UR: ABNORMAL
URN SPEC COLLECT METH UR: ABNORMAL
UROBILINOGEN UR STRIP-ACNC: 0.2 E.U./DL
WBC # BLD AUTO: 6.8 K/UL (ref 4–11)

## 2024-05-21 PROCEDURE — 96374 THER/PROPH/DIAG INJ IV PUSH: CPT

## 2024-05-21 PROCEDURE — 99285 EMERGENCY DEPT VISIT HI MDM: CPT

## 2024-05-21 PROCEDURE — 6370000000 HC RX 637 (ALT 250 FOR IP): Performed by: STUDENT IN AN ORGANIZED HEALTH CARE EDUCATION/TRAINING PROGRAM

## 2024-05-21 PROCEDURE — 85025 COMPLETE CBC W/AUTO DIFF WBC: CPT

## 2024-05-21 PROCEDURE — 2580000003 HC RX 258: Performed by: PHYSICIAN ASSISTANT

## 2024-05-21 PROCEDURE — 81003 URINALYSIS AUTO W/O SCOPE: CPT

## 2024-05-21 PROCEDURE — 96375 TX/PRO/DX INJ NEW DRUG ADDON: CPT

## 2024-05-21 PROCEDURE — 82010 KETONE BODYS QUAN: CPT

## 2024-05-21 PROCEDURE — 6360000004 HC RX CONTRAST MEDICATION: Performed by: PHYSICIAN ASSISTANT

## 2024-05-21 PROCEDURE — 6360000002 HC RX W HCPCS: Performed by: PHYSICIAN ASSISTANT

## 2024-05-21 PROCEDURE — 80053 COMPREHEN METABOLIC PANEL: CPT

## 2024-05-21 PROCEDURE — 83690 ASSAY OF LIPASE: CPT

## 2024-05-21 PROCEDURE — 83735 ASSAY OF MAGNESIUM: CPT

## 2024-05-21 PROCEDURE — 6360000002 HC RX W HCPCS: Performed by: STUDENT IN AN ORGANIZED HEALTH CARE EDUCATION/TRAINING PROGRAM

## 2024-05-21 PROCEDURE — 82803 BLOOD GASES ANY COMBINATION: CPT

## 2024-05-21 PROCEDURE — 96361 HYDRATE IV INFUSION ADD-ON: CPT

## 2024-05-21 PROCEDURE — 74177 CT ABD & PELVIS W/CONTRAST: CPT

## 2024-05-21 RX ORDER — KETOROLAC TROMETHAMINE 30 MG/ML
30 INJECTION, SOLUTION INTRAMUSCULAR; INTRAVENOUS ONCE
Status: COMPLETED | OUTPATIENT
Start: 2024-05-21 | End: 2024-05-21

## 2024-05-21 RX ORDER — 0.9 % SODIUM CHLORIDE 0.9 %
1000 INTRAVENOUS SOLUTION INTRAVENOUS ONCE
Status: COMPLETED | OUTPATIENT
Start: 2024-05-21 | End: 2024-05-21

## 2024-05-21 RX ORDER — FAMOTIDINE 20 MG/1
20 TABLET, FILM COATED ORAL 2 TIMES DAILY
Qty: 60 TABLET | Refills: 0 | Status: SHIPPED | OUTPATIENT
Start: 2024-05-21

## 2024-05-21 RX ORDER — PANTOPRAZOLE SODIUM 40 MG/1
40 TABLET, DELAYED RELEASE ORAL
Qty: 90 TABLET | Refills: 1 | Status: SHIPPED | OUTPATIENT
Start: 2024-05-21

## 2024-05-21 RX ORDER — ONDANSETRON 2 MG/ML
4 INJECTION INTRAMUSCULAR; INTRAVENOUS ONCE
Status: COMPLETED | OUTPATIENT
Start: 2024-05-21 | End: 2024-05-21

## 2024-05-21 RX ORDER — POTASSIUM CHLORIDE 7.45 MG/ML
10 INJECTION INTRAVENOUS
Status: DISCONTINUED | OUTPATIENT
Start: 2024-05-21 | End: 2024-05-21

## 2024-05-21 RX ADMIN — KETOROLAC TROMETHAMINE 30 MG: 30 INJECTION, SOLUTION INTRAMUSCULAR at 18:28

## 2024-05-21 RX ADMIN — ONDANSETRON 4 MG: 2 INJECTION INTRAMUSCULAR; INTRAVENOUS at 18:16

## 2024-05-21 RX ADMIN — INSULIN HUMAN 10 UNITS: 100 INJECTION, SOLUTION PARENTERAL at 18:28

## 2024-05-21 RX ADMIN — POTASSIUM CHLORIDE 10 MEQ: 7.46 INJECTION, SOLUTION INTRAVENOUS at 19:42

## 2024-05-21 RX ADMIN — IOPAMIDOL 75 ML: 755 INJECTION, SOLUTION INTRAVENOUS at 18:50

## 2024-05-21 RX ADMIN — SODIUM CHLORIDE 1000 ML: 9 INJECTION, SOLUTION INTRAVENOUS at 18:16

## 2024-05-21 ASSESSMENT — ENCOUNTER SYMPTOMS
DIARRHEA: 0
WHEEZING: 0
CHEST TIGHTNESS: 0
BLOOD IN STOOL: 0
BACK PAIN: 0
COUGH: 0
VOMITING: 1
SHORTNESS OF BREATH: 0
ABDOMINAL PAIN: 1
COLOR CHANGE: 0
CONSTIPATION: 0
ABDOMINAL DISTENTION: 0
NAUSEA: 1

## 2024-05-21 ASSESSMENT — PAIN SCALES - GENERAL: PAINLEVEL_OUTOF10: 5

## 2024-05-21 NOTE — ED PROVIDER NOTES
In addition to the advanced practice provider, I personally saw Dagoberto Kumar and performed a substantive portion of the visit including all aspects of the medical decision making.    Medical Decision Making    Ipad  used  Here with N/V and abd pain to epigastrium since Friday.  Hx of chronic pancreatitis, etoh abuse, DM  Hx of gallstones     On exam pt is resting comfortably  Cardiac RRR, no murmur  Lungs clear bilaterally, no increased work of breathing  Abdomen soft nontender  No calf edema or tenderness  Neuro no drift in extremities           SEP-1  Is this patient to be included in the SEP-1 Core Measure due to severe sepsis or septic shock?   No   Exclusion criteria - the patient is NOT to be included for SEP-1 Core Measure due to:  Infection is not suspected    Screenings     [place] Coma Scale  Eye Opening: Spontaneous  Best Verbal Response: Oriented  Best Motor Response: Obeys commands  Rajesh Coma Scale Score: 15        CT ABDOMEN PELVIS W IV CONTRAST Additional Contrast? None   Final Result   1. Apparent thickening of the gastric antrum which could be due to   nondistention, but gastritis or peptic ulcer disease cannot be excluded.   2. Borderline prostatomegaly and moderate distension of the bladder raising   the question of some degree of bladder outlet obstruction.  There is also new   mild right hydronephrosis and hydroureter to the level of the pelvic brim   without a visible renal stone.  Hydronephrosis could be secondary to   extrinsic compression related to distension of the bladder.   3. Mild hepatic steatosis.           Labs Reviewed   CBC WITH AUTO DIFFERENTIAL - Abnormal; Notable for the following components:       Result Value    RBC 3.77 (*)     Hemoglobin 12.5 (*)     Hematocrit 34.7 (*)     All other components within normal limits   COMPREHENSIVE METABOLIC PANEL - Abnormal; Notable for the following components:    Sodium 121 (*)     Chloride 78 (*)     Anion Gap 20 (*)     
actually alkalotic at 7.453.  Beta hydroxybutyrate is normal.  LFTs with an elevated total bilirubin of 1.2.  Lipase is normal.  Urine analysis with glucose >1,000 and no signs of infection.  Magnesium is normal.    I have ordered a 1 L fluid bolus.  He is given Zofran for nausea and Toradol for pain.  I have ordered regular insulin 10 units    CT abdomen and pelvis with IV contrast is pending.            As this is the end of my shift the attending physician will continue care of this patient. Dagoberto Kumar was signed out in stable condition. Please see Stephania Fernandez MD note for further details, including diagnosis and disposition.        FINAL IMPRESSION      1. Abdominal pain, epigastric    2. Nausea and vomiting, unspecified vomiting type          DISPOSITION/PLAN     DISPOSITION        PATIENT REFERRED TO:  No follow-up provider specified.    DISCHARGE MEDICATIONS:  New Prescriptions    No medications on file       DISCONTINUED MEDICATIONS:  Discontinued Medications    No medications on file              (Please note that portions of this note were completed with a voice recognition program.  Efforts were made to edit the dictations but occasionally words are mis-transcribed.)    Pacheco Maxwell PA-C (electronically signed)           Pacheco Maxwell PA-C  05/21/24 [id number]